# Patient Record
Sex: FEMALE | Race: AMERICAN INDIAN OR ALASKA NATIVE | NOT HISPANIC OR LATINO | Employment: UNEMPLOYED | URBAN - METROPOLITAN AREA
[De-identification: names, ages, dates, MRNs, and addresses within clinical notes are randomized per-mention and may not be internally consistent; named-entity substitution may affect disease eponyms.]

---

## 2017-04-26 ENCOUNTER — OFFICE VISIT (OUTPATIENT)
Dept: OPHTHALMOLOGY | Facility: CLINIC | Age: 72
End: 2017-04-26
Attending: OPHTHALMOLOGY
Payer: COMMERCIAL

## 2017-04-26 DIAGNOSIS — H26.493 PCO (POSTERIOR CAPSULAR OPACIFICATION), BILATERAL: ICD-10-CM

## 2017-04-26 DIAGNOSIS — H40.2232 CHRONIC ANGLE-CLOSURE GLAUCOMA OF BOTH EYES, MODERATE STAGE: Primary | ICD-10-CM

## 2017-04-26 PROCEDURE — 92083 EXTENDED VISUAL FIELD XM: CPT | Mod: ZF | Performed by: OPHTHALMOLOGY

## 2017-04-26 PROCEDURE — 99213 OFFICE O/P EST LOW 20 MIN: CPT | Mod: ZF

## 2017-04-26 PROCEDURE — 92015 DETERMINE REFRACTIVE STATE: CPT | Mod: ZF

## 2017-04-26 RX ORDER — DORZOLAMIDE HYDROCHLORIDE AND TIMOLOL MALEATE 20; 5 MG/ML; MG/ML
1 SOLUTION/ DROPS OPHTHALMIC 2 TIMES DAILY
Qty: 1 BOTTLE | Refills: 11 | Status: SHIPPED | OUTPATIENT
Start: 2017-04-26 | End: 2020-01-09

## 2017-04-26 ASSESSMENT — REFRACTION_WEARINGRX
OD_SPHERE: PLANO
OD_ADD: +2.50
OS_AXIS: 180
OD_CYLINDER: +1.00
OD_AXIS: 180
OS_SPHERE: +0.25
OS_CYLINDER: +0.25
OS_ADD: +2.50

## 2017-04-26 ASSESSMENT — REFRACTION_MANIFEST
OS_ADD: +2.50
OD_ADD: +2.50
OS_CYLINDER: +0.25
OS_AXIS: 180
OS_SPHERE: +0.25
OD_AXIS: 180
OD_CYLINDER: +1.00
OD_SPHERE: PLANO

## 2017-04-26 ASSESSMENT — TONOMETRY
OS_IOP_MMHG: 14
OD_IOP_MMHG: 16
IOP_METHOD: APPLANATION

## 2017-04-26 ASSESSMENT — VISUAL ACUITY
OS_CC: 20/20
METHOD: SNELLEN - LINEAR
OD_CC: J16
OD_CC: 20/40
OS_CC: J1+

## 2017-04-26 ASSESSMENT — SLIT LAMP EXAM - LIDS: COMMENTS: DERMATOCHALSIS WITH LASH OVERHANG

## 2017-04-26 ASSESSMENT — CONF VISUAL FIELD
OD_NORMAL: 1
OS_NORMAL: 1

## 2017-04-26 ASSESSMENT — CUP TO DISC RATIO
OS_RATIO: 0.3
OD_RATIO: 0.4

## 2017-04-26 ASSESSMENT — EXTERNAL EXAM - LEFT EYE: OS_EXAM: BROW PTOSIS

## 2017-04-26 NOTE — NURSING NOTE
Chief Complaints and History of Present Illnesses   Patient presents with     Follow Up For     s/p Chronic angle-closure glaucoma(365.23) (Primary Dx);      HPI    Affected eye(s):  Both   Symptoms:     Blurred vision   Decreased vision   Floaters   Tearing      Frequency:  Constant       Do you have eye pain now?:  No      Comments:  Pt stated foggy vision RE 3-4 months along with colorful flashes of light RE over the last 3 days.   No Cosopt drops used over the last 6 months RE.  No AT's  Jesus Tabares  2:41 PM April 26, 2017

## 2017-04-26 NOTE — MR AVS SNAPSHOT
After Visit Summary   4/26/2017    Marleen Bobo    MRN: [id number]           Patient Information     Date Of Birth          1945        Visit Information        Provider Department      4/26/2017 2:00 PM Juan Rosenthal MD [facility]        Today's Diagnoses     Chronic angle-closure glaucoma of right eye, unspecified glaucoma stage    -  1       Follow-ups after your visit        Your next 10 appointments already scheduled     May 17, 2017 12:45 PM CDT   RETURN GENERAL with Juan Rosenthal MD   Eye Clinic ([facility])    [address]  [address] [facility]  [address]  [facility] [address]   [phone]              Who to contact     Please call your clinic at [phone] to:    Ask questions about your health    Make or cancel appointments    Discuss your medicines    Learn about your test results    Speak to your doctor   If you have compliments or concerns about an experience at your clinic, or if you wish to file a complaint, please contact [facility] Patient Relations at [phone] or email us at [email or web address]         Additional Information About Your Visit        MyChart Information     [employer] gives you secure access to your electronic health record. If you see a primary care provider, you can also send messages to your care team and make appointments. If you have questions, please call your primary care clinic.  If you do not have a primary care provider, please call [phone] and they will assist you.      [employer] is an electronic gateway that provides easy, online access to your medical records. With [employer], you can request a clinic appointment, read your test results, renew a prescription or communicate with your care team.     To access your existing account, please contact your [facility] or call [phone] for assistance.        Care EveryWhere ID      This is your Care EveryWhere ID. This could be used by other organizations to access your [facility] medical records  [id number]         Blood Pressure from Last 3 Encounters:   No data found for BP    Weight from Last 3 Encounters:   No data found for Wt              We Performed the Following     OVF 24-2 Dynamic OU          Where to get your medicines      These medications were sent to [employer] [facility] [address] [address] AT [address] [address]     Phone:  [phone]     dorzolamide-timolol 2-0.5 % ophthalmic solution          Primary Care Provider Office Phone # Fax #    [facility] [phone]       [address] [facility] [address]        Thank you!     Thank you for choosing EYE CLINIC  for your care. Our goal is always to provide you with excellent care. Hearing back from our patients is one way we can continue to improve our services. Please take a few minutes to complete the written survey that you may receive in the mail after your visit with us. Thank you!             Your Updated Medication List - Protect others around you: Learn how to safely use, store and throw away your medicines at www.disposemymeds.org.          This list is accurate as of: 4/26/17  4:17 PM.  Always use your most recent med list.                   Brand Name Dispense Instructions for use    * carbidopa-levodopa  MG per tablet    SINEMET     Take 1 tablet by mouth 3 times daily       * carbidopa-levodopa  mg per CR half-tab    SINEMET CR     Take 1 half-tab by mouth 2 times daily       dorzolamide-timolol 2-0.5 % ophthalmic solution    COSOPT    1 Bottle    Place 1 drop into the right eye 2 times daily       etanercept 50 MG/ML injection    ENBREL     Inject 50 mg Subcutaneous once a week       hydrochlorothiazide 25 MG tablet    HYDRODIURIL     Take 25 mg by mouth daily       loratadine 10 MG tablet    CLARITIN      Take 10 mg by mouth daily       omeprazole 20 MG tablet      Take 20 mg by mouth daily       PERCOCET 7.5-325 MG per tablet   Generic drug:  oxyCODONE-acetaminophen      Take 1 tablet by mouth every 4 hours as needed       * Notice:  This list has 2 medication(s) that are the same as other medications prescribed for you. Read the directions carefully, and ask your doctor or other care provider to review them with you.

## 2017-04-26 NOTE — PROGRESS NOTES
"Ms. Bobo is a 70 year old female with chronic angle closure glaucoma, s/p cataract extraction both eyes who presents for follow up. Has noted new \"film\" across vision in the right eye the past 2-3 months. Has slightly worsened the past few months. Previously was on Cosopt BID in the right eye, ran out \"at least 6 months ago\" and has not been using since that time. Did not seek evaluation sooner due to outside familial stressors.     Has chronic floaters, no change in floaters but has had occasional, rare flashes of \"swirling\" light the past week.     PMH notable for Parkinson's disease, rheumatoid arthritis, hypertension. States blood pressure well controlled.    Denies any changes in vision in the left eye.    Ocular history  Chronic angle closure glacuoma  CE/IOL both eyes, iStent right eye  SLT right eye      Assessment & Plan      Marleen Bobo is a 71 year old female with the following diagnoses:   1. Chronic angle-closure glaucoma of both eyes, moderate stage    2. PCO (posterior capsular opacification), bilateral       Marleen has moderately dense diffuse PCO in the right eye which is likely the culprit for her visual changes. Her visual fields look worse in both eyes today but have poor reliability and have fluctuated over time. Her intraocular pressures are not elevated but are higher than they have been the past two visits. The necessity of resuming Cosopt BID in the right eye was discussed with the patient, and will plan to have the patient RTC in 3 weeks for:    -Yag capsulotomy, right eye  -RNFL-OCT- both eyes; to assess if patient is developing further glaucomatous changes  -IOP check back with the patient back on Cosopt    Juan Rosenthal MD  PGY3, Dept of Ophthalmology    Attending Physician Attestation: Complete documentation of historical and exam elements from today's encounter can be found in the full encounter summary report (not reduplicated in this progress note). I personally obtained " the chief complaint(s) and history of present illness.  I confirmed and edited as necessary the review of systems, past medical/surgical history, family history, social history, and examination findings as documented by others; and I examined the patient myself. I personally reviewed the relevant tests, images, and reports as documented above. I formulated and edited as necessary the assessment and plan and discussed the findings and management plan with the patient and family. - Dario Johnston MD  4/26/2017   Attending Physician Image/Tesing Attestation: I have personally view and interpreted all testing/images as documented in imaging/procedures

## 2017-05-17 ENCOUNTER — OFFICE VISIT (OUTPATIENT)
Dept: OPHTHALMOLOGY | Facility: CLINIC | Age: 72
End: 2017-05-17
Attending: OPHTHALMOLOGY
Payer: COMMERCIAL

## 2017-05-17 DIAGNOSIS — H40.2230 CHRONIC ANGLE-CLOSURE GLAUCOMA OF BOTH EYES, UNSPECIFIED GLAUCOMA STAGE: Primary | ICD-10-CM

## 2017-05-17 DIAGNOSIS — H26.491 PCO (POSTERIOR CAPSULE OPACIFICATION), RIGHT: ICD-10-CM

## 2017-05-17 PROCEDURE — 66821 AFTER CATARACT LASER SURGERY: CPT | Mod: ZF | Performed by: OPHTHALMOLOGY

## 2017-05-17 PROCEDURE — 99212 OFFICE O/P EST SF 10 MIN: CPT | Mod: ZF

## 2017-05-17 PROCEDURE — 92133 CPTRZD OPH DX IMG PST SGM ON: CPT | Mod: ZF | Performed by: OPHTHALMOLOGY

## 2017-05-17 ASSESSMENT — TONOMETRY
OS_IOP_MMHG: 15
IOP_METHOD: TONOPEN
OD_IOP_MMHG: 10

## 2017-05-17 ASSESSMENT — REFRACTION_WEARINGRX
OS_CYLINDER: +0.25
OD_CYLINDER: +1.00
OS_ADD: +2.50
OD_AXIS: 180
OD_SPHERE: PLANO
OD_ADD: +2.50
OS_AXIS: 180
OS_SPHERE: +0.25

## 2017-05-17 ASSESSMENT — VISUAL ACUITY
CORRECTION_TYPE: GLASSES
OS_CC: 20/20
OD_CC: 20/30
METHOD: SNELLEN - LINEAR

## 2017-05-17 ASSESSMENT — CONF VISUAL FIELD
OD_NORMAL: 1
OS_NORMAL: 1

## 2017-05-17 ASSESSMENT — SLIT LAMP EXAM - LIDS: COMMENTS: DERMATOCHALSIS WITH LASH OVERHANG

## 2017-05-17 ASSESSMENT — CUP TO DISC RATIO
OS_RATIO: 0.3
OD_RATIO: 0.4

## 2017-05-17 ASSESSMENT — EXTERNAL EXAM - LEFT EYE: OS_EXAM: BROW PTOSIS

## 2017-05-17 NOTE — NURSING NOTE
Chief Complaints and History of Present Illnesses   Patient presents with     Follow Up For     s/p Chronic angle-closure glaucoma of both eyes, moderate stage      HPI    Affected eye(s):  Right   Symptoms:     Blurred vision   Floaters   Flashes   Tearing   Dryness (Comment: RE more than Left)      Duration:  3 weeks   Frequency:  Constant       Do you have eye pain now?:  No      Comments:  Pt. stated foggy vision RE 3-4 months along with colorful flashes of light RE 1-2 times daily.   Cosopt RE last night 9:00 PM  Jesus Tabares  12:48 PM May 17, 2017

## 2017-05-17 NOTE — MR AVS SNAPSHOT
After Visit Summary   5/17/2017    Marleen Bobo    MRN: [id number]           Patient Information     Date Of Birth          1945        Visit Information        Provider Department      5/17/2017 12:45 PM Juan Rosenthal MD [facility]        Today's Diagnoses     Chronic angle-closure glaucoma of both eyes, unspecified glaucoma stage    -  1    PCO (posterior capsule opacification), right           Follow-ups after your visit        Your next 10 appointments already scheduled     May 24, 2017 12:45 PM CDT   RETURN GENERAL with Juan Rosenthal MD   Eye Clinic ([facility])    [address]  [address] [facility]  9[address] Clin [address]  [facility] [address]   [phone]              Who to contact     Please call your clinic at [phone] to:    Ask questions about your health    Make or cancel appointments    Discuss your medicines    Learn about your test results    Speak to your doctor   If you have compliments or concerns about an experience at your clinic, or if you wish to file a complaint, please contact [facility] Patient Relations at [phone] or email us at [email or web address]         Additional Information About Your Visit        MyChart Information     [employer] gives you secure access to your electronic health record. If you see a primary care provider, you can also send messages to your care team and make appointments. If you have questions, please call your primary care clinic.  If you do not have a primary care provider, please call [phone] and they will assist you.      [employer] is an electronic gateway that provides easy, online access to your medical records. With [employer], you can request a clinic appointment, read your test results, renew a prescription or communicate with your care team.     To access your existing account, please contact your [facility] or call  [phone] for assistance.        Care EveryWhere ID     This is your Care EveryWhere ID. This could be used by other organizations to access your [facility] medical records  [id number]         Blood Pressure from Last 3 Encounters:   No data found for BP    Weight from Last 3 Encounters:   No data found for Wt              We Performed the Following     DILATED FUNDUS EXAM     OCT Optic Nerve RNFL Spectralis OU (both eyes)     OCT Optic Nerve RNFL Spectralis OU (both eyes)     YAG Capsulotomy OD (right eye)        Primary Care Provider Office Phone # Fax #    [facility] [phone] [address] [facility] [address]        Thank you!     Thank you for choosing EYE CLINIC  for your care. Our goal is always to provide you with excellent care. Hearing back from our patients is one way we can continue to improve our services. Please take a few minutes to complete the written survey that you may receive in the mail after your visit with us. Thank you!             Your Updated Medication List - Protect others around you: Learn how to safely use, store and throw away your medicines at www.disposemymeds.org.          This list is accurate as of: 5/17/17  2:02 PM.  Always use your most recent med list.                   Brand Name Dispense Instructions for use    * carbidopa-levodopa  MG per tablet    SINEMET     Take 1 tablet by mouth 3 times daily       * carbidopa-levodopa  mg per CR half-tab    SINEMET CR     Take 1 half-tab by mouth 2 times daily       dorzolamide-timolol 2-0.5 % ophthalmic solution    COSOPT    1 Bottle    Place 1 drop into the right eye 2 times daily       etanercept 50 MG/ML injection    ENBREL     Inject 50 mg Subcutaneous once a week       hydrochlorothiazide 25 MG tablet    HYDRODIURIL     Take 25 mg by mouth daily       loratadine 10 MG tablet    CLARITIN     Take 10 mg by mouth daily       omeprazole 20 MG tablet      Take 20 mg  by mouth daily       PERCOCET 7.5-325 MG per tablet   Generic drug:  oxyCODONE-acetaminophen      Take 1 tablet by mouth every 4 hours as needed       * Notice:  This list has 2 medication(s) that are the same as other medications prescribed for you. Read the directions carefully, and ask your doctor or other care provider to review them with you.

## 2017-05-17 NOTE — PROGRESS NOTES
Ms. Bobo is a 70 year old female here for follow up chronic angle closure glaucoma, right eye. Restarted Cosopt BID after last visit, no issues. Vision stable, seeing through film in right eye. Interested in Yag capsulotomy today.      Ocular history  Chronic angle closure glacuoma  CE/IOL both eyes, iStent right eye  SLT right eye      Assessment & Plan      Marleen Bobo is a 71 year old female with the following diagnoses:   1. Chronic angle-closure glaucoma of both eyes, unspecified glaucoma stage    2. 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 Her intraocular pressure has come down nicely since resuming Cosopt. Will resume BID and have patient follow up with Dr Johnston    Yag capsulotomy performed OD today; will have patient follow up in one week for jeremias      Juan Rosenthal MD  PGY3, Dept of Ophthalmology    Teaching statement:  Complete documentation of historical and exam elements from today's encounter can be found in the full encounter summary report (not reduplicated in this progress note). I personally obtained the chief complaint(s) and history of present illness.  I confirmed and edited as necessary the review of systems, past medical/surgical history, family history, social history, and examination findings as documented by others; and I examined the patient myself. I personally reviewed the relevant tests, images, and reports as documented above.     I formulated and edited as necessary the assessment and plan and discussed the findings and management plan with the patient and family.    Ana Maya MD  Comprehensive Ophthalmology & Ocular Pathology  Department of Ophthalmology and Visual Neurosciences  [email or web address]  Pager [phone]

## 2017-05-24 ENCOUNTER — OFFICE VISIT (OUTPATIENT)
Dept: OPHTHALMOLOGY | Facility: CLINIC | Age: 72
End: 2017-05-24
Attending: OPHTHALMOLOGY
Payer: COMMERCIAL

## 2017-05-24 DIAGNOSIS — H26.491 PCO (POSTERIOR CAPSULE OPACIFICATION), RIGHT: Primary | ICD-10-CM

## 2017-05-24 DIAGNOSIS — H40.2232 CHRONIC ANGLE-CLOSURE GLAUCOMA OF BOTH EYES, MODERATE STAGE: ICD-10-CM

## 2017-05-24 PROCEDURE — 99213 OFFICE O/P EST LOW 20 MIN: CPT | Mod: ZF

## 2017-05-24 ASSESSMENT — REFRACTION_MANIFEST
OD_ADD: +2.50
OD_SPHERE: +0.25
OD_CYLINDER: +1.00
OD_AXIS: 180
OS_CYLINDER: +0.25
OS_AXIS: 180
OS_SPHERE: +0.25
OS_ADD: +2.50

## 2017-05-24 ASSESSMENT — REFRACTION_WEARINGRX
OD_AXIS: 180
OD_SPHERE: PLANO
OS_AXIS: 180
OD_CYLINDER: +1.00
OD_ADD: +2.50
OS_CYLINDER: +0.25
OS_SPHERE: +0.25
OS_ADD: +2.50

## 2017-05-24 ASSESSMENT — CUP TO DISC RATIO: OD_RATIO: 0.4

## 2017-05-24 ASSESSMENT — TONOMETRY
OS_IOP_MMHG: 12
OD_IOP_MMHG: 15
IOP_METHOD: TONOPEN

## 2017-05-24 ASSESSMENT — VISUAL ACUITY
OD_CC: 20/40
OD_PH_CC: 20/30
OS_CC: J1
OS_CC: 20/30
OD_CC: J2
METHOD: SNELLEN - LINEAR

## 2017-05-24 ASSESSMENT — SLIT LAMP EXAM - LIDS: COMMENTS: DERMATOCHALSIS WITH LASH OVERHANG

## 2017-05-24 ASSESSMENT — EXTERNAL EXAM - RIGHT EYE: OD_EXAM: NORMAL

## 2017-05-24 ASSESSMENT — CONF VISUAL FIELD
OS_NORMAL: 1
OD_NORMAL: 1

## 2017-05-24 ASSESSMENT — EXTERNAL EXAM - LEFT EYE: OS_EXAM: BROW PTOSIS

## 2017-05-24 NOTE — NURSING NOTE
Chief Complaints and History of Present Illnesses   Patient presents with     Follow Up For     s/p Chronic angle-closure glaucoma of both eyes, unspecified glaucoma stage      HPI    Affected eye(s):  Right   Symptoms:     No blurred vision   No decreased vision   No distorted vision   No floaters   No flashes   No foreign body sensation   No Dryness      Duration:  1 week   Frequency:  Constant       Do you have eye pain now?:  No      Comments:  Pt stated vision has improved right eye over the last 1 week.  Jesus Tabares  12:50 PM May 24, 2017

## 2017-05-24 NOTE — MR AVS SNAPSHOT
After Visit Summary   5/24/2017    Marleen Bobo    MRN: [id number]           Patient Information     Date Of Birth          1945        Visit Information        Provider Department      5/24/2017 12:45 PM Juan Rosenthal MD [facility]        Today's Diagnoses     PCO (posterior capsule opacification), right    -  1    Chronic angle-closure glaucoma of both eyes, moderate stage           Follow-ups after your visit        Follow-up notes from your care team     Return in about 6 months (around 11/24/2017) for glaucoma follow up with Dr Johnston.      Who to contact     Please call your clinic at [phone] to:    Ask questions about your health    Make or cancel appointments    Discuss your medicines    Learn about your test results    Speak to your doctor   If you have compliments or concerns about an experience at your clinic, or if you wish to file a complaint, please contact [facility] Patient Relations at [phone] or email us at [email or web address]         Additional Information About Your Visit        MyChart Information     [employer] gives you secure access to your electronic health record. If you see a primary care provider, you can also send messages to your care team and make appointments. If you have questions, please call your primary care clinic.  If you do not have a primary care provider, please call [phone] and they will assist you.      [employer] is an electronic gateway that provides easy, online access to your medical records. With [employer], you can request a clinic appointment, read your test results, renew a prescription or communicate with your care team.     To access your existing account, please contact your [facility] or call [phone] for assistance.        Care EveryWhere ID     This is your Care EveryWhere ID. This could be used by other organizations to access your [facility]  medical records  [id number]         Blood Pressure from Last 3 Encounters:   No data found for BP    Weight from Last 3 Encounters:   No data found for Wt              Today, you had the following     No orders found for display       Primary Care Provider Office Phone # Fax #    [facility] [phone] [address]  [facility] [address]        Thank you!     Thank you for choosing EYE CLINIC  for your care. Our goal is always to provide you with excellent care. Hearing back from our patients is one way we can continue to improve our services. Please take a few minutes to complete the written survey that you may receive in the mail after your visit with us. Thank you!             Your Updated Medication List - Protect others around you: Learn how to safely use, store and throw away your medicines at www.disposemymeds.org.          This list is accurate as of: 5/24/17  4:16 PM.  Always use your most recent med list.                   Brand Name Dispense Instructions for use    * carbidopa-levodopa  MG per tablet    SINEMET     Take 1 tablet by mouth 3 times daily       * carbidopa-levodopa  mg per CR half-tab    SINEMET CR     Take 1 half-tab by mouth 2 times daily       dorzolamide-timolol 2-0.5 % ophthalmic solution    COSOPT    1 Bottle    Place 1 drop into the right eye 2 times daily       etanercept 50 MG/ML injection    ENBREL     Inject 50 mg Subcutaneous once a week       hydrochlorothiazide 25 MG tablet    HYDRODIURIL     Take 25 mg by mouth daily       loratadine 10 MG tablet    CLARITIN     Take 10 mg by mouth daily       omeprazole 20 MG tablet      Take 20 mg by mouth daily       PERCOCET 7.5-325 MG per tablet   Generic drug:  oxyCODONE-acetaminophen      Take 1 tablet by mouth every 4 hours as needed       * Notice:  This list has 2 medication(s) that are the same as other medications prescribed for you. Read the directions carefully, and  ask your doctor or other care provider to review them with you.

## 2017-05-24 NOTE — PROGRESS NOTES
Ms. Bobo is a 71 year old female here for 1 week follow up s/p Yag capsulotomy, right eye. Feels that vision is much clearer and no longer a film over her vision. Forgot to use Cosopt this morning.       Ocular history  Chronic angle closure glacuoma  CE/IOL both eyes, iStent right eye  SLT right eye      Assessment & Plan      Marleen Bobo is a 71 year old female with the following diagnoses:   1. PCO (posterior capsule opacification), right    2. Chronic angle-closure glaucoma of both eyes, moderate stage       Refracts to 20/20 OU s/p Yag capsulotomy OD. MRx given.  IOP acceptable; forgot to use Cosopt today  Continue Cosopt BID OD  RNFL-OCT and visual fields stable last visit- recommend follow up for glaucoma with Dr Johnston in 6 months      Juan Rosenthal MD  PGY3, Dept of Ophthalmology    Teaching statement:  Complete documentation of historical and exam elements from today's encounter can be found in the full encounter summary report (not reduplicated in this progress note). I personally obtained the chief complaint(s) and history of present illness.  I confirmed and edited as necessary the review of systems, past medical/surgical history, family history, social history, and examination findings as documented by others; and I examined the patient myself. I personally reviewed the relevant tests, images, and reports as documented above.     I formulated and edited as necessary the assessment and plan and discussed the findings and management plan with the patient and family.    Ana Maya MD  Comprehensive Ophthalmology & Ocular Pathology  Department of Ophthalmology and Visual Neurosciences  [email or web address]  Pager [phone]

## 2018-01-04 ENCOUNTER — TRANSFERRED RECORDS (OUTPATIENT)
Dept: HEALTH INFORMATION MANAGEMENT | Facility: CLINIC | Age: 73
End: 2018-01-04

## 2018-04-30 ENCOUNTER — TRANSFERRED RECORDS (OUTPATIENT)
Dept: HEALTH INFORMATION MANAGEMENT | Facility: CLINIC | Age: 73
End: 2018-04-30

## 2018-07-31 DIAGNOSIS — H40.2230 CHRONIC ANGLE-CLOSURE GLAUCOMA OF BOTH EYES: Primary | ICD-10-CM

## 2019-04-08 ENCOUNTER — TRANSFERRED RECORDS (OUTPATIENT)
Dept: HEALTH INFORMATION MANAGEMENT | Facility: CLINIC | Age: 74
End: 2019-04-08

## 2019-09-29 ENCOUNTER — HEALTH MAINTENANCE LETTER (OUTPATIENT)
Age: 74
End: 2019-09-29

## 2019-11-26 ENCOUNTER — TRANSCRIBE ORDERS (OUTPATIENT)
Dept: OTHER | Age: 74
End: 2019-11-26

## 2019-11-26 DIAGNOSIS — M54.9 BACK PAIN: ICD-10-CM

## 2019-11-26 DIAGNOSIS — M06.9 RA (RHEUMATOID ARTHRITIS) (H): ICD-10-CM

## 2019-11-26 DIAGNOSIS — M25.551 HIP PAIN, RIGHT: Primary | ICD-10-CM

## 2019-12-09 ENCOUNTER — DOCUMENTATION ONLY (OUTPATIENT)
Dept: CARE COORDINATION | Facility: CLINIC | Age: 74
End: 2019-12-09

## 2019-12-10 NOTE — TELEPHONE ENCOUNTER
DIAGNOSIS: Right Hip and low back pain -  Referred by Octaviano Nolan CNP from [facility] -  Med Recs at [facility]     APPOINTMENT DATE: 12.17.19   NOTES STATUS DETAILS   OFFICE NOTE from referring provider Received 11.26.19 [facility]   OFFICE NOTE from other specialist Care every where  [facility] [place]   DISCHARGE SUMMARY from hospital N/A    DISCHARGE REPORT from the ER N/A    OPERATIVE REPORT N/A    MEDICATION LIST N/A    IMPLANT RECORD/STICKER N/A    LABS     CBC/DIFF Internal 7.4.09   CULTURES N/A    INJECTIONS DONE IN RADIOLOGY N/A    MRI N/A    CT SCAN N/A    XRAYS (IMAGES & REPORTS) internal 7.4.09 abd/pelvis   TUMOR     PATHOLOGY  Slides & report N/A      12.10.19 MJ 1:46 PM  Requested med recs and images from [facility] . Nothing from [facility] in care Everywhere related to hip and back pain.    12.11.19 RH 10:27AM  Sent request to [facility] [place] for ultrasound imaging- recieved

## 2019-12-17 ENCOUNTER — PRE VISIT (OUTPATIENT)
Dept: ORTHOPEDICS | Facility: CLINIC | Age: 74
End: 2019-12-17

## 2019-12-23 ENCOUNTER — OFFICE VISIT (OUTPATIENT)
Dept: ORTHOPEDICS | Facility: CLINIC | Age: 74
End: 2019-12-23
Payer: COMMERCIAL

## 2019-12-23 ENCOUNTER — ANCILLARY PROCEDURE (OUTPATIENT)
Dept: GENERAL RADIOLOGY | Facility: CLINIC | Age: 74
End: 2019-12-23
Payer: COMMERCIAL

## 2019-12-23 VITALS — SYSTOLIC BLOOD PRESSURE: 127 MMHG | DIASTOLIC BLOOD PRESSURE: 64 MMHG | HEART RATE: 60 BPM

## 2019-12-23 DIAGNOSIS — M53.3 CHRONIC RIGHT SI JOINT PAIN: ICD-10-CM

## 2019-12-23 DIAGNOSIS — M54.50 LOW BACK PAIN, UNSPECIFIED BACK PAIN LATERALITY, UNSPECIFIED CHRONICITY, UNSPECIFIED WHETHER SCIATICA PRESENT: ICD-10-CM

## 2019-12-23 DIAGNOSIS — G89.29 CHRONIC MIDLINE LOW BACK PAIN WITHOUT SCIATICA: Primary | ICD-10-CM

## 2019-12-23 DIAGNOSIS — M25.551 RIGHT HIP PAIN: ICD-10-CM

## 2019-12-23 DIAGNOSIS — M54.50 CHRONIC MIDLINE LOW BACK PAIN WITHOUT SCIATICA: Primary | ICD-10-CM

## 2019-12-23 DIAGNOSIS — G89.29 CHRONIC RIGHT SI JOINT PAIN: ICD-10-CM

## 2019-12-23 DIAGNOSIS — M54.50 LOW BACK PAIN, UNSPECIFIED BACK PAIN LATERALITY, UNSPECIFIED CHRONICITY, UNSPECIFIED WHETHER SCIATICA PRESENT: Primary | ICD-10-CM

## 2019-12-23 RX ORDER — CARBIDOPA AND LEVODOPA 50; 200 MG/1; MG/1
1 TABLET, EXTENDED RELEASE ORAL AT BEDTIME
COMMUNITY
Start: 2016-12-29

## 2019-12-23 RX ORDER — HYDROCHLOROTHIAZIDE 50 MG/1
TABLET ORAL
COMMUNITY
Start: 2019-11-22 | End: 2022-04-07

## 2019-12-23 ASSESSMENT — PATIENT HEALTH QUESTIONNAIRE - PHQ9
SUM OF ALL RESPONSES TO PHQ QUESTIONS 1-9: 4
SUM OF ALL RESPONSES TO PHQ QUESTIONS 1-9: 4
10. IF YOU CHECKED OFF ANY PROBLEMS, HOW DIFFICULT HAVE THESE PROBLEMS MADE IT FOR YOU TO DO YOUR WORK, TAKE CARE OF THINGS AT HOME, OR GET ALONG WITH OTHER PEOPLE: SOMEWHAT DIFFICULT

## 2019-12-23 ASSESSMENT — PAIN SCALES - GENERAL: PAINLEVEL: EXTREME PAIN (8)

## 2019-12-23 NOTE — PROGRESS NOTES
Subjective:   Marleen Bobo is a 74 year old female who presents with midline low back pain and right hip pain. Prior hx of L4-S1 fusion.  She states that in approximately 1991 she had an L4-S1 fusion.  This is treated her quite well until the summer 2019 when she was weeding in her garden and she got up and she twisted and fell.  Since that time she has been having low back pain which occurs in a belt-like fashion across her lower back.  She also has some pain radiating to the lateral right hip.  She has no groin pain.  She has no cauda equina symptoms.  She has no perineal anesthesia or paresthesias.  She has no lower extremity radiculopathy.  While she does have a history of ongoing peripheral neuropathy this is not changed at all.  She notes that she is having more difficulty with some falling intermittently but she attributes this to her worsening Parkinson's disease.  She states that they have suggested a deep brain stimulator however she is not amenable to this.  Because she has had multiple surgeries on her cranium and metal in place she is not a candidate for an MRI.    AP and lateral of her lumbar spine demonstrate an L4-S1 fusion.  She otherwise has degenerative changes of the lumbar spine.    2 views of the right hip demonstrate some very mild early degenerative changes but no evidence of fracture or significant arthritis per my read.    Background:   Date of injury: None  Duration of symptoms: 4-5 months low back; years for hip  Mechanism of Injury: Insidious Onset for low back while weeding. Chronic for hip.  Aggravating factors: Right side lying, walking, stairs, in/out of car  Relieving Factors: Percocet  Prior Evaluation: Dr. Neff    PAST MEDICAL, SOCIAL, SURGICAL AND FAMILY HISTORY: She  has a past medical history of Arthritis, Double vision, Glaucoma, Headache(784.0), Hypertension, Sneezing, and Stroke (H). She also has no past medical history of Diabetes (H).  She  has a past surgical  history that includes cataract iol, rt/lt (Right, 1/14/15) and cataract iol, rt/lt (Left, 2015).  Her family history includes Arthritis in her mother; Cardiovascular in her father; Cerebrovascular Disease in her father; Diabetes in her maternal grandmother; Glaucoma in her mother.  She reports that she quit smoking about 8 years ago. Her smoking use included cigarettes. She has never used smokeless tobacco. She reports current alcohol use.    ALLERGIES: She is allergic to tegaderm transparent dressing (informational only) and dilantin [phenytoin].    CURRENT MEDICATIONS: She has a current medication list which includes the following prescription(s): carbidopa-levodopa, hydrochlorothiazide, hydrochlorothiazide, loratadine, oxycodone-acetaminophen, carbidopa-levodopa, carbidopa-levodopa, dorzolamide-timolol, etanercept, and omeprazole.     REVIEW OF SYSTEMS: 10 point review of systems is negative except as noted above.     Exam:   /64 (BP Location: Left arm, Patient Position: Sitting, Cuff Size: Adult Regular)   Pulse 60      CONSTITUTIONAL: alert and no distress  HEAD: Normocephalic. No masses, lesions, tenderness or abnormalities  SKIN: no suspicious lesions or rashes  GAIT: broad based and shuffling  NEUROLOGIC: Non-focal  PSYCHIATRIC: affect flat and mentation appears normal.    MUSCULOSKELETAL:   Right hip: She has full range of motion in the right hip.  She has no groin pain with full flexion or full internal rotation.  She has no medial or lateral discomfort with full external rotation.  Flexion, adduction and internal rotation is negative.  Flexion, abduction and external rotation is negative.  She is nontender over the femoral triangle or over the greater trochanter.    Lumbar spine: She is nontender over the thoracic or lumbar spine.  She is tender to palpation over the right SI joint.  She is nontender over the left SI joint.  Deep tendon reflexes in the bilateral lower extremities are trace and  symmetric including patellar and Achilles.  She has equal and symmetric strength which would be 5- out of 5 bilaterally including hip flexion, knee extension, knee flexion, ankle dorsiflexion and ankle plantarflexion.       Assessment/Plan:   Marleen is a 74-year-old female who is status post L4-S1 lumbar fusion who is had some falls associated with her Parkinson's disease and since a fall this summer has had very slow recovery in terms of her lumbar spine.  She will not tolerate an MRI.  I am going to have her see 1 of our spine surgeons for further evaluation.  I believe her right hip pain is primarily emanating from the right SI joint.  When she is seen her hip surgeon they may determine they want to proceed with a right SI joint injection.  In the interim we will put her in a lumbosacral corset which may be beneficial.

## 2019-12-24 ASSESSMENT — PATIENT HEALTH QUESTIONNAIRE - PHQ9: SUM OF ALL RESPONSES TO PHQ QUESTIONS 1-9: 4

## 2019-12-31 ENCOUNTER — MEDICAL CORRESPONDENCE (OUTPATIENT)
Dept: HEALTH INFORMATION MANAGEMENT | Facility: CLINIC | Age: 74
End: 2019-12-31

## 2020-01-03 DIAGNOSIS — M54.50 LUMBAR PAIN: Primary | ICD-10-CM

## 2020-01-07 ENCOUNTER — OFFICE VISIT (OUTPATIENT)
Dept: ORTHOPEDICS | Facility: CLINIC | Age: 75
End: 2020-01-07
Attending: FAMILY MEDICINE
Payer: COMMERCIAL

## 2020-01-07 ENCOUNTER — ANCILLARY PROCEDURE (OUTPATIENT)
Dept: GENERAL RADIOLOGY | Facility: CLINIC | Age: 75
End: 2020-01-07
Attending: ORTHOPAEDIC SURGERY
Payer: COMMERCIAL

## 2020-01-07 DIAGNOSIS — M54.50 LUMBAR PAIN: Primary | ICD-10-CM

## 2020-01-07 ASSESSMENT — ENCOUNTER SYMPTOMS
HEMATURIA: 0
NAUSEA: 0
DIARRHEA: 0
DIZZINESS: 0
ARTHRALGIAS: 1
SEIZURES: 0
LOSS OF CONSCIOUSNESS: 0
MUSCLE CRAMPS: 0
CONSTIPATION: 1
DISTURBANCES IN COORDINATION: 0
HEADACHES: 1
BLOATING: 0
ABDOMINAL PAIN: 0
SPEECH CHANGE: 0
BACK PAIN: 1
HEARTBURN: 1
NECK PAIN: 0
MEMORY LOSS: 0
DYSURIA: 0
JOINT SWELLING: 1
VOMITING: 0
MYALGIAS: 0

## 2020-01-07 ASSESSMENT — PATIENT HEALTH QUESTIONNAIRE - PHQ9
SUM OF ALL RESPONSES TO PHQ QUESTIONS 1-9: 7
SUM OF ALL RESPONSES TO PHQ QUESTIONS 1-9: 7

## 2020-01-07 NOTE — NURSING NOTE
Reason For Visit:   Chief Complaint   Patient presents with     Consult     chronic midline low abck pain        There were no vitals taken for this visit.         Hadley Strickland, ATC

## 2020-01-07 NOTE — LETTER
1/7/2020       RE: Marleen Bobo  [address]  [facility] [id number]     Dear Colleague,    Thank you for referring your patient, Marlene Bobo, to the [facility] at [facility]. Please see a copy of my visit note below.    Spine Surgery Consultation    REFERRING PHYSICIAN: Keegan Mixon   PRIMARY CARE PHYSICIAN: [facility]           Chief Complaint:   Consult (chronic midline low abck pain )  Low back and right buttock area pain    History of Present Illness:  Symptom Profile Including: location of symptoms, onset, severity, exacerbating/alleviating factors, previous treatments:        Marleen Bobo is a 74 year old female with PMH of Parkinson's disease, L4-S1 fusion in the 1990s, L3-4 decompression and 2009 who presents with several months of increasingly worse lower back pain and right buttock area pain after a ground-level fall in June 2019.  Patient states that her pain is primarily over the lower back and right paraspinal and buttock area.  Her pain has gotten significantly worse over the past 2 months.  She is able to stand only for 5 minutes and able to walk no more than 1 block.  She has a cane and walker but does not use it because she feels embarrassed.  She rates her pain as 10 out of 10 and has occasional awakening at night if she rolls the wrong way.  She is currently taking Percocet at night.  She cannot tolerate physical therapy due to pain.  She has not had any epidural steroid injections and does not want any.  She has tried gabapentin without any relief.  She denies any additional symptoms including numbness tingling or weakness.  She has had urinary incontinence for the past 2 to 3 years for which she was told this was a symptom of aging.  She presents today for evaluation and discussion of treatment options.  She states that her symptoms are significantly limiting her ability to perform her daily  activities.         Past Medical History:     Past Medical History:   Diagnosis Date     Arthritis      Double vision      Glaucoma      Headache(784.0)      Hypertension      Sneezing      Stroke (H)             Past Surgical History:     Past Surgical History:   Procedure Laterality Date     CATARACT IOL, RT/LT Right 1/14/15    with iStent     CATARACT IOL, RT/LT Left             Social History:     Social History     Tobacco Use     Smoking status: Former Smoker     Types: Cigarettes     Last attempt to quit: 10/25/2011     Years since quittin.2     Smokeless tobacco: Never Used   Substance Use Topics     Alcohol use: Yes     Comment: about 3 cans a month     The patient has quit smoking 10 years ago and denies any alcohol or other drug use.  She is retired and lives with her family.           Family History:     Family History   Problem Relation Age of Onset     Diabetes Maternal Grandmother      Cerebrovascular Disease Father      Cardiovascular Father      Arthritis Mother      Glaucoma Mother             Allergies:     Allergies   Allergen Reactions     Tegaderm Transparent Dressing (Informational Only) Rash     Dilantin [Phenytoin]             Medications:     Current Outpatient Medications   Medication     carbidopa-levodopa (SINEMET CR)  MG     carbidopa-levodopa (SINEMET CR)  MG CR tablet     carbidopa-levodopa (SINEMET)  MG per tablet     dorzolamide-timolol (COSOPT) 2-0.5 % ophthalmic solution     etanercept (ENBREL) 50 MG/ML injection     hydrochlorothiazide (HYDRODIURIL) 25 MG tablet     hydrochlorothiazide (HYDRODIURIL) 50 MG tablet     loratadine (CLARITIN) 10 MG tablet     omeprazole 20 MG tablet     oxyCODONE-acetaminophen (PERCOCET) 7.5-325 MG per tablet     No current facility-administered medications for this visit.              Review of Systems:     A 10 point ROS was performed and reviewed. Specific responses to these questions are noted at the end of the  document.         Physical Exam:   Vitals: There were no vitals taken for this visit.  Constitutional: awake, alert, cooperative, no apparent distress, appears stated age.    Eyes: The sclera are white.  Ears, Nose, Throat: The trachea is midline.  Psychiatric: The patient has a normal affect.  Respiratory: Breathing non-labored  Cardiovascular: The extremities are warm and perfused.  Pill-rolling tremor bilateral upper extremities.  Skin: No obvious rashes or lesions.  Musculoskeletal, Neurologic, and Spine:          Lumbar Spine:    Appearance - No gross stepoffs or deformities    Motor -     L2-3: Hip flexion 5/5 R and 5/5 L strength          L3/4:  Knee extension R 5/5 and L 5/5 strength         L4/5:  Foot dorsiflexion R 5/5 L 5/5 and       EHL dorsiflexion R 5/5 L 5/5 strength         S1:  Plantarflexion/Peroneal Muscles  R 5/5 and L 5/5 strength    Sensation: intact to light touch L3-S1 distribution BLE      Neurologic:      REFLEXES Left Right                  Patella 1+ 1+   Ankle jerk 1+ 1+   Babinski No upgoing great toe No upgoing great toe   Clonus 0 beats 0 beats       Alignment:  Patient stands with a neutral standing sagittal balance.       Healed midline incision over the lumbar spine.  No pain with forward flexion.  Pain in the lower right lumbar spine with right spinal rotation side bending and facet loading.  Significant tenderness to palpation over the lumbar spine midline and the right paraspinal musculature around the area of the SI joint.  Tenderness to palpation over the right greater trochanter.  No pain with flexion internal rotation of the right hip.  Negative straight leg raise bilaterally.  Negative Reyna test bilaterally.  Negative thigh thrust, pelvic thrust, AP and lateral stress of the SI joints, Gaenslen's test.         Imaging:   We ordered and independently reviewed new radiographs at this clinic visit. The results were discussed with the patient.  Findings include:    X-rays  lumbar spine flexion and extension obtained on 1/7/2020 demonstrates status post L4 S1 fusion without instrumentation.  Significant spondylosis diffuse across the lumbar spine.  No evidence of instability.  Seen also are right iliac stent and multiple vascular clips from a prior non-orthopedic surgery.           Assessment and Plan:   Assessment:  74 year old female with medical history significant for Parkinson's disease, L4-S1 fusion in the 1990s, L3-4 decompression and 2009 who presents with several months of increasingly worse lower back pain and right buttock area pain after a ground-level fall in June 2019.  The patient has been taking Percocet at night, states that gabapentin does not work, cannot tolerate physical therapy due to pain, and has not tried any injections. Differential may include occult fracture of the lumbar spine, spinal stenosis, or disc herniation.  No clinical evidence of sacroiliitis.    Plan:  1. CT myelogram of the lumbar spine (the patient cannot get an MRI due to brain aneurysm clips)  2. Follow-up clinic same day after imaging to discuss results and next steps with Dr. Noe  3. Pain clinic referral for evaluation and treatment  4. Recommend use of cane or walker to prevent additional falls - patient is agreeable  5. No indication for SI injection given that she has no provocative SI findings on exam today.    This patient was seen and discussed with Dr. Noe who agrees with the above    Bala Olivarez MD, PhD  Orthopedic surgery resident PGY 4  [facility]    Attending MD (Dr. Julian Noe) :  I reviewed and verified the history and physical exam of the patient and discussed the patient's management with the other clinical providers involved in this patient's care including any involved residents or physicians assistants. I reviewed the above note and agree with the documented findings and plan of care, which were communicated to the patient.      Julian  SHIVA Noe MD

## 2020-01-07 NOTE — PROGRESS NOTES
Spine Surgery Consultation    REFERRING PHYSICIAN: Keegan Mixon   PRIMARY CARE PHYSICIAN: Clinic, [facility]           Chief Complaint:   Consult (chronic midline low abck pain )  Low back and right buttock area pain    History of Present Illness:  Symptom Profile Including: location of symptoms, onset, severity, exacerbating/alleviating factors, previous treatments:        Marleen Bobo is a 74 year old female with PMH of Parkinson's disease, L4-S1 fusion in the 1990s, L3-4 decompression and 2009 who presents with several months of increasingly worse lower back pain and right buttock area pain after a ground-level fall in June 2019.  Patient states that her pain is primarily over the lower back and right paraspinal and buttock area.  Her pain has gotten significantly worse over the past 2 months.  She is able to stand only for 5 minutes and able to walk no more than 1 block.  She has a cane and walker but does not use it because she feels embarrassed.  She rates her pain as 10 out of 10 and has occasional awakening at night if she rolls the wrong way.  She is currently taking Percocet at night.  She cannot tolerate physical therapy due to pain.  She has not had any epidural steroid injections and does not want any.  She has tried gabapentin without any relief.  She denies any additional symptoms including numbness tingling or weakness.  She has had urinary incontinence for the past 2 to 3 years for which she was told this was a symptom of aging.  She presents today for evaluation and discussion of treatment options.  She states that her symptoms are significantly limiting her ability to perform her daily activities.         Past Medical History:     Past Medical History:   Diagnosis Date     Arthritis      Double vision      Glaucoma      Headache(784.0)      Hypertension      Sneezing      Stroke (H)             Past Surgical History:     Past Surgical History:   Procedure Laterality Date      CATARACT IOL, RT/LT Right 1/14/15    with iStent     CATARACT IOL, RT/LT Left             Social History:     Social History     Tobacco Use     Smoking status: Former Smoker     Types: Cigarettes     Last attempt to quit: 10/25/2011     Years since quittin.2     Smokeless tobacco: Never Used   Substance Use Topics     Alcohol use: Yes     Comment: about 3 cans a month     The patient has quit smoking 10 years ago and denies any alcohol or other drug use.  She is retired and lives with her family.           Family History:     Family History   Problem Relation Age of Onset     Diabetes Maternal Grandmother      Cerebrovascular Disease Father      Cardiovascular Father      Arthritis Mother      Glaucoma Mother             Allergies:     Allergies   Allergen Reactions     Tegaderm Transparent Dressing (Informational Only) Rash     Dilantin [Phenytoin]             Medications:     Current Outpatient Medications   Medication     carbidopa-levodopa (SINEMET CR)  MG     carbidopa-levodopa (SINEMET CR)  MG CR tablet     carbidopa-levodopa (SINEMET)  MG per tablet     dorzolamide-timolol (COSOPT) 2-0.5 % ophthalmic solution     etanercept (ENBREL) 50 MG/ML injection     hydrochlorothiazide (HYDRODIURIL) 25 MG tablet     hydrochlorothiazide (HYDRODIURIL) 50 MG tablet     loratadine (CLARITIN) 10 MG tablet     omeprazole 20 MG tablet     oxyCODONE-acetaminophen (PERCOCET) 7.5-325 MG per tablet     No current facility-administered medications for this visit.              Review of Systems:     A 10 point ROS was performed and reviewed. Specific responses to these questions are noted at the end of the document.         Physical Exam:   Vitals: There were no vitals taken for this visit.  Constitutional: awake, alert, cooperative, no apparent distress, appears stated age.    Eyes: The sclera are white.  Ears, Nose, Throat: The trachea is midline.  Psychiatric: The patient has a normal  affect.  Respiratory: Breathing non-labored  Cardiovascular: The extremities are warm and perfused.  Pill-rolling tremor bilateral upper extremities.  Skin: No obvious rashes or lesions.  Musculoskeletal, Neurologic, and Spine:          Lumbar Spine:    Appearance - No gross stepoffs or deformities    Motor -     L2-3: Hip flexion 5/5 R and 5/5 L strength          L3/4:  Knee extension R 5/5 and L 5/5 strength         L4/5:  Foot dorsiflexion R 5/5 L 5/5 and       EHL dorsiflexion R 5/5 L 5/5 strength         S1:  Plantarflexion/Peroneal Muscles  R 5/5 and L 5/5 strength    Sensation: intact to light touch L3-S1 distribution BLE      Neurologic:      REFLEXES Left Right                  Patella 1+ 1+   Ankle jerk 1+ 1+   Babinski No upgoing great toe No upgoing great toe   Clonus 0 beats 0 beats       Alignment:  Patient stands with a neutral standing sagittal balance.       Healed midline incision over the lumbar spine.  No pain with forward flexion.  Pain in the lower right lumbar spine with right spinal rotation side bending and facet loading.  Significant tenderness to palpation over the lumbar spine midline and the right paraspinal musculature around the area of the SI joint.  Tenderness to palpation over the right greater trochanter.  No pain with flexion internal rotation of the right hip.  Negative straight leg raise bilaterally.  Negative Reyna test bilaterally.  Negative thigh thrust, pelvic thrust, AP and lateral stress of the SI joints, Gaenslen's test.         Imaging:   We ordered and independently reviewed new radiographs at this clinic visit. The results were discussed with the patient.  Findings include:    X-rays lumbar spine flexion and extension obtained on 1/7/2020 demonstrates status post L4 S1 fusion without instrumentation.  Significant spondylosis diffuse across the lumbar spine.  No evidence of instability.  Seen also are right iliac stent and multiple vascular clips from a prior  non-orthopedic surgery.           Assessment and Plan:   Assessment:  74 year old female with medical history significant for Parkinson's disease, L4-S1 fusion in the 1990s, L3-4 decompression and 2009 who presents with several months of increasingly worse lower back pain and right buttock area pain after a ground-level fall in June 2019.  The patient has been taking Percocet at night, states that gabapentin does not work, cannot tolerate physical therapy due to pain, and has not tried any injections. Differential may include occult fracture of the lumbar spine, spinal stenosis, or disc herniation.  No clinical evidence of sacroiliitis.    Plan:  1. CT myelogram of the lumbar spine (the patient cannot get an MRI due to brain aneurysm clips)  2. Follow-up clinic same day after imaging to discuss results and next steps with Dr. Noe  3. Pain clinic referral for evaluation and treatment  4. Recommend use of cane or walker to prevent additional falls - patient is agreeable  5. No indication for SI injection given that she has no provocative SI findings on exam today.    This patient was seen and discussed with Dr. Noe who agrees with the above    Bala Olivarez MD, PhD  Orthopedic surgery resident PGY 4  [facility]    Attending MD (Dr. Julian Noe) :  I reviewed and verified the history and physical exam of the patient and discussed the patient's management with the other clinical providers involved in this patient's care including any involved residents or physicians assistants. I reviewed the above note and agree with the documented findings and plan of care, which were communicated to the patient.      Julian Noe MD    Respectfully,  Julian Noe MD  Spine Surgery  [facility]

## 2020-01-07 NOTE — PROGRESS NOTES
Spine Surgery Consultation    REFERRING PHYSICIAN: Keegan Mixon   PRIMARY CARE PHYSICIAN: Clinic, [facility]           Chief Complaint:   Consult (chronic midline low abck pain )      History of Present Illness:  Symptom Profile Including: location of symptoms, onset, severity, exacerbating/alleviating factors, previous treatments:        Marleen Bobo is a 74 year old female who ***         Past Medical History:     Past Medical History:   Diagnosis Date     Arthritis      Double vision      Glaucoma      Headache(784.0)      Hypertension      Sneezing      Stroke (H)             Past Surgical History:     Past Surgical History:   Procedure Laterality Date     CATARACT IOL, RT/LT Right 1/14/15    with iStent     CATARACT IOL, RT/LT Left             Social History:     Social History     Tobacco Use     Smoking status: Former Smoker     Types: Cigarettes     Last attempt to quit: 10/25/2011     Years since quittin.2     Smokeless tobacco: Never Used   Substance Use Topics     Alcohol use: Yes     Comment: about 3 cans a month            Family History:     Family History   Problem Relation Age of Onset     Diabetes Maternal Grandmother      Cerebrovascular Disease Father      Cardiovascular Father      Arthritis Mother      Glaucoma Mother             Allergies:     Allergies   Allergen Reactions     Tegaderm Transparent Dressing (Informational Only) Rash     Dilantin [Phenytoin]             Medications:     Current Outpatient Medications   Medication     carbidopa-levodopa (SINEMET CR)  MG     carbidopa-levodopa (SINEMET CR)  MG CR tablet     carbidopa-levodopa (SINEMET)  MG per tablet     dorzolamide-timolol (COSOPT) 2-0.5 % ophthalmic solution     etanercept (ENBREL) 50 MG/ML injection     hydrochlorothiazide (HYDRODIURIL) 25 MG tablet     hydrochlorothiazide (HYDRODIURIL) 50 MG tablet     loratadine (CLARITIN) 10 MG tablet     omeprazole 20 MG tablet      oxyCODONE-acetaminophen (PERCOCET) 7.5-325 MG per tablet     No current facility-administered medications for this visit.              Review of Systems:     A 10 point ROS was performed and reviewed. Specific responses to these questions are noted at the end of the document.         Physical Exam:   Vitals: There were no vitals taken for this visit.  Constitutional: awake, alert, cooperative, no apparent distress, appears stated age.    Eyes: The sclera are white.  Ears, Nose, Throat: The trachea is midline.  Psychiatric: The patient has a normal affect.  Respiratory: breathing non-labored  Cardiovascular: The extremities are warm and perfused.  Skin: no obvious rashes or lesions.  Musculoskeletal, Neurologic, and Spine:   ***         Imaging:   We ordered and independently reviewed new radiographs at this clinic visit. The results were discussed with the patient.  Findings include:    ***             Assessment and Plan:   Assessment:  74 year old female with ***     Plan:  1. ***      Respectfully,  Julian Noe MD  Spine Surgery  [facility]      Answers for HPI/ROS submitted by the patient on 1/7/2020   PHQ9 TOTAL SCORE: 7  General Symptoms: No  Skin Symptoms: No  HENT Symptoms: No  EYE SYMPTOMS: No  HEART SYMPTOMS: No  LUNG SYMPTOMS: No  INTESTINAL SYMPTOMS: Yes  URINARY SYMPTOMS: Yes  GYNECOLOGIC SYMPTOMS: No  BREAST SYMPTOMS: No  SKELETAL SYMPTOMS: Yes  BLOOD SYMPTOMS: No  NERVOUS SYSTEM SYMPTOMS: Yes  MENTAL HEALTH SYMPTOMS: No  Heart burn or indigestion: Yes  Nausea: No  Vomiting: No  Abdominal pain: No  Bloating: No  Constipation: Yes  Diarrhea: No  Trouble holding urine or incontinence: Yes  Pain or burning: No  Trouble starting or stopping: No  Increased frequency of urination: No  Blood in urine: No  Decreased frequency of urination: No  Frequent nighttime urination: No  Back pain: Yes  Muscle aches: No  Neck pain: No  Swollen joints: Yes  Joint pain: Yes  Muscle cramps:  No  Trouble with coordination: No  Dizziness or trouble with balance: No  Fainting or black-out spells: No  Memory loss: No  Headache: Yes  Seizures: No  Speech problems: No

## 2020-01-08 ASSESSMENT — PATIENT HEALTH QUESTIONNAIRE - PHQ9: SUM OF ALL RESPONSES TO PHQ QUESTIONS 1-9: 7

## 2020-03-15 ENCOUNTER — HEALTH MAINTENANCE LETTER (OUTPATIENT)
Age: 75
End: 2020-03-15

## 2021-01-14 ENCOUNTER — HEALTH MAINTENANCE LETTER (OUTPATIENT)
Age: 76
End: 2021-01-14

## 2021-05-09 ENCOUNTER — HEALTH MAINTENANCE LETTER (OUTPATIENT)
Age: 76
End: 2021-05-09

## 2021-08-06 DIAGNOSIS — H40.2230 CHRONIC ANGLE-CLOSURE GLAUCOMA OF BOTH EYES, UNSPECIFIED GLAUCOMA STAGE: Primary | ICD-10-CM

## 2021-10-24 ENCOUNTER — HEALTH MAINTENANCE LETTER (OUTPATIENT)
Age: 76
End: 2021-10-24

## 2022-03-07 ENCOUNTER — HOSPITAL ENCOUNTER (OUTPATIENT)
Facility: CLINIC | Age: 77
End: 2022-03-07
Attending: ORTHOPAEDIC SURGERY | Admitting: ORTHOPAEDIC SURGERY
Payer: COMMERCIAL

## 2022-03-10 DIAGNOSIS — Z11.59 ENCOUNTER FOR SCREENING FOR OTHER VIRAL DISEASES: Primary | ICD-10-CM

## 2022-04-07 VITALS — HEIGHT: 66 IN | BODY MASS INDEX: 30.53 KG/M2 | WEIGHT: 190 LBS

## 2022-04-07 RX ORDER — ALBUTEROL SULFATE 0.83 MG/ML
3 SOLUTION RESPIRATORY (INHALATION) EVERY 4 HOURS PRN
COMMUNITY
End: 2022-04-13 | Stop reason: HOSPADM

## 2022-04-07 RX ORDER — ALBUTEROL SULFATE 90 UG/1
1-2 AEROSOL, METERED RESPIRATORY (INHALATION) EVERY 4 HOURS PRN
COMMUNITY
End: 2022-04-13 | Stop reason: HOSPADM

## 2022-04-07 RX ORDER — LORATADINE 10 MG/1
1 TABLET ORAL DAILY PRN
COMMUNITY
Start: 2010-12-22 | End: 2022-04-13 | Stop reason: HOSPADM

## 2022-06-05 ENCOUNTER — HEALTH MAINTENANCE LETTER (OUTPATIENT)
Age: 77
End: 2022-06-05

## 2022-10-15 ENCOUNTER — HEALTH MAINTENANCE LETTER (OUTPATIENT)
Age: 77
End: 2022-10-15

## 2023-06-11 ENCOUNTER — HEALTH MAINTENANCE LETTER (OUTPATIENT)
Age: 78
End: 2023-06-11

## 2023-07-19 RX ORDER — BUDESONIDE AND FORMOTEROL FUMARATE DIHYDRATE 80; 4.5 UG/1; UG/1
2 AEROSOL RESPIRATORY (INHALATION) DAILY PRN
COMMUNITY
Start: 2023-06-29

## 2023-07-19 RX ORDER — NITROGLYCERIN 0.4 MG/1
0.4 TABLET SUBLINGUAL PRN
COMMUNITY
Start: 2022-06-13

## 2023-07-19 RX ORDER — CLOPIDOGREL BISULFATE 75 MG/1
1 TABLET ORAL EVERY MORNING
COMMUNITY
Start: 2023-06-06

## 2023-07-19 RX ORDER — LOSARTAN POTASSIUM 50 MG/1
1 TABLET ORAL DAILY
Status: ON HOLD | COMMUNITY
Start: 2023-07-10 | End: 2023-08-03

## 2023-07-19 RX ORDER — LORATADINE 10 MG/1
10 TABLET ORAL DAILY PRN
COMMUNITY
Start: 2022-04-13

## 2023-07-19 RX ORDER — ROSUVASTATIN CALCIUM 20 MG/1
20 TABLET, COATED ORAL DAILY
COMMUNITY
Start: 2022-04-12

## 2023-07-21 LAB — HBA1C MFR BLD: 5.5 %

## 2023-07-24 ENCOUNTER — TRANSFERRED RECORDS (OUTPATIENT)
Dept: MULTI SPECIALTY CLINIC | Facility: CLINIC | Age: 78
End: 2023-07-24

## 2023-07-26 ENCOUNTER — TRANSFERRED RECORDS (OUTPATIENT)
Dept: MULTI SPECIALTY CLINIC | Facility: CLINIC | Age: 78
End: 2023-07-26

## 2023-07-26 LAB
CREATININE (EXTERNAL): 0.71 MG/DL (ref 0.6–1)
GFR ESTIMATED (EXTERNAL): 88 ML/MIN/1.73M2
GLUCOSE (EXTERNAL): 86 MG/DL (ref 65–99)
POTASSIUM (EXTERNAL): 4.3 MMOL/L (ref 3.5–5.3)

## 2023-07-26 RX ORDER — ASPIRIN 81 MG/1
81 TABLET, CHEWABLE ORAL DAILY
COMMUNITY

## 2023-07-26 NOTE — PHARMACY-ADMISSION MEDICATION HISTORY
Medication history and patient interview completed by pre-admitting RN or pre-op/PACU RN. Reviewed by pharmacist, [facility], [facility] Everywhere, and chart review.       Balta Tran, Pharm.D., [facility]      Last Reviewed by Trudi Olivier, RN on 7/19/2023 at 9:35 AM       Prior to Admission medications    Medication Sig Last Dose Taking? Auth Provider Long Term End Date   aspirin (ASA) 81 MG chewable tablet Take 81 mg by mouth daily  Yes Unknown, Entered By History     budesonide-formoterol (SYMBICORT) 80-4.5 MCG/ACT Inhaler Inhale 2 puffs into the lungs daily as needed  Yes Reported, Patient Yes    carbidopa-levodopa (SINEMET CR)  MG CR tablet Take 1 tablet by mouth At Bedtime And 1 tablet during the night [id number] upon waking with tremors  Yes Reported, Patient Yes    carbidopa-levodopa (SINEMET)  MG per tablet Take 3 tablets by mouth 3 times daily At 0900, 1300 and 1700  Yes Reported, Patient Yes    clopidogrel (PLAVIX) 75 MG tablet Take 1 tablet by mouth every morning  Yes Reported, Patient Yes    etanercept (ENBREL) 50 MG/ML injection Inject 50 mg Subcutaneous once a week  Yes Reported, Patient Yes    loratadine (CLARITIN) 10 MG tablet Take 10 mg by mouth daily as needed  Yes Reported, Patient     losartan (COZAAR) 50 MG tablet Take 1 tablet by mouth daily  Yes Reported, Patient No    nitroGLYcerin (NITROSTAT) 0.4 MG sublingual tablet Place 0.4 mg under the tongue as needed  Yes Reported, Patient Yes    oxyCODONE-acetaminophen (PERCOCET) 7.5-325 MG per tablet Take 1 tablet by mouth every 4 hours as needed for pain Max 4 tabs/day  Yes Reported, Patient     rosuvastatin (CRESTOR) 20 MG tablet Take 20 mg by mouth daily  Yes Reported, Patient Yes

## 2023-07-28 ENCOUNTER — APPOINTMENT (OUTPATIENT)
Dept: GENERAL RADIOLOGY | Facility: CLINIC | Age: 78
DRG: 454 | End: 2023-07-28
Attending: ORTHOPAEDIC SURGERY
Payer: COMMERCIAL

## 2023-07-28 ENCOUNTER — HOSPITAL ENCOUNTER (INPATIENT)
Facility: CLINIC | Age: 78
LOS: 6 days | Discharge: HOME HEALTH | DRG: 454 | End: 2023-08-03
Attending: ORTHOPAEDIC SURGERY | Admitting: ORTHOPAEDIC SURGERY
Payer: COMMERCIAL

## 2023-07-28 ENCOUNTER — ANESTHESIA (OUTPATIENT)
Dept: SURGERY | Facility: CLINIC | Age: 78
DRG: 454 | End: 2023-07-28
Payer: COMMERCIAL

## 2023-07-28 ENCOUNTER — ANESTHESIA EVENT (OUTPATIENT)
Dept: SURGERY | Facility: CLINIC | Age: 78
DRG: 454 | End: 2023-07-28
Payer: COMMERCIAL

## 2023-07-28 DIAGNOSIS — Z98.1 HISTORY OF FUSION OF LUMBAR SPINE: ICD-10-CM

## 2023-07-28 DIAGNOSIS — G47.33 OSA (OBSTRUCTIVE SLEEP APNEA): ICD-10-CM

## 2023-07-28 DIAGNOSIS — M54.50 LUMBAR PAIN: ICD-10-CM

## 2023-07-28 DIAGNOSIS — G89.18 POSTOPERATIVE PAIN: ICD-10-CM

## 2023-07-28 DIAGNOSIS — Z79.899 HIGH RISK MEDICATION USE: Primary | ICD-10-CM

## 2023-07-28 PROBLEM — M48.061 LUMBAR STENOSIS: Status: ACTIVE | Noted: 2023-07-28

## 2023-07-28 LAB
ABO/RH(D): NORMAL
ANTIBODY SCREEN: NEGATIVE
GLUCOSE BLDC GLUCOMTR-MCNC: 102 MG/DL (ref 70–99)
HGB BLD-MCNC: 11.9 G/DL (ref 11.7–15.7)
SPECIMEN EXPIRATION DATE: NORMAL

## 2023-07-28 PROCEDURE — 250N000013 HC RX MED GY IP 250 OP 250 PS 637: Performed by: ORTHOPAEDIC SURGERY

## 2023-07-28 PROCEDURE — 258N000003 HC RX IP 258 OP 636: Performed by: NURSE ANESTHETIST, CERTIFIED REGISTERED

## 2023-07-28 PROCEDURE — 258N000003 HC RX IP 258 OP 636: Performed by: ORTHOPAEDIC SURGERY

## 2023-07-28 PROCEDURE — 250N000011 HC RX IP 250 OP 636: Performed by: ANESTHESIOLOGY

## 2023-07-28 PROCEDURE — 250N000013 HC RX MED GY IP 250 OP 250 PS 637: Performed by: ANESTHESIOLOGY

## 2023-07-28 PROCEDURE — 250N000025 HC SEVOFLURANE, PER MIN: Performed by: ORTHOPAEDIC SURGERY

## 2023-07-28 PROCEDURE — 272N000001 HC OR GENERAL SUPPLY STERILE: Performed by: ORTHOPAEDIC SURGERY

## 2023-07-28 PROCEDURE — 999N000141 HC STATISTIC PRE-PROCEDURE NURSING ASSESSMENT: Performed by: ORTHOPAEDIC SURGERY

## 2023-07-28 PROCEDURE — 258N000003 HC RX IP 258 OP 636: Performed by: ANESTHESIOLOGY

## 2023-07-28 PROCEDURE — 250N000009 HC RX 250: Performed by: ORTHOPAEDIC SURGERY

## 2023-07-28 PROCEDURE — 01NB3ZZ RELEASE LUMBAR NERVE, PERCUTANEOUS APPROACH: ICD-10-PCS | Performed by: ORTHOPAEDIC SURGERY

## 2023-07-28 PROCEDURE — 0SG13K1 FUSION OF 2 OR MORE LUMBAR VERTEBRAL JOINTS WITH NONAUTOLOGOUS TISSUE SUBSTITUTE, POSTERIOR APPROACH, POSTERIOR COLUMN, PERCUTANEOUS APPROACH: ICD-10-PCS | Performed by: ORTHOPAEDIC SURGERY

## 2023-07-28 PROCEDURE — 272N000002 HC OR SUPPLY OTHER OPNP: Performed by: ORTHOPAEDIC SURGERY

## 2023-07-28 PROCEDURE — 250N000011 HC RX IP 250 OP 636: Mod: JZ | Performed by: ORTHOPAEDIC SURGERY

## 2023-07-28 PROCEDURE — 120N000001 HC R&B MED SURG/OB

## 2023-07-28 PROCEDURE — 999N000179 XR SURGERY CARM FLUORO LESS THAN 5 MIN W STILLS: Mod: TC

## 2023-07-28 PROCEDURE — 710N000010 HC RECOVERY PHASE 1, LEVEL 2, PER MIN: Performed by: ORTHOPAEDIC SURGERY

## 2023-07-28 PROCEDURE — 250N000011 HC RX IP 250 OP 636: Mod: JZ | Performed by: NURSE ANESTHETIST, CERTIFIED REGISTERED

## 2023-07-28 PROCEDURE — 250N000011 HC RX IP 250 OP 636: Mod: JZ | Performed by: ANESTHESIOLOGY

## 2023-07-28 PROCEDURE — 86850 RBC ANTIBODY SCREEN: CPT | Performed by: ORTHOPAEDIC SURGERY

## 2023-07-28 PROCEDURE — 0ST20ZZ RESECTION OF LUMBAR VERTEBRAL DISC, OPEN APPROACH: ICD-10-PCS | Performed by: ORTHOPAEDIC SURGERY

## 2023-07-28 PROCEDURE — 4A11X4G MONITORING OF PERIPHERAL NERVOUS ELECTRICAL ACTIVITY, INTRAOPERATIVE, EXTERNAL APPROACH: ICD-10-PCS | Performed by: ORTHOPAEDIC SURGERY

## 2023-07-28 PROCEDURE — 250N000011 HC RX IP 250 OP 636: Performed by: ORTHOPAEDIC SURGERY

## 2023-07-28 PROCEDURE — C1713 ANCHOR/SCREW BN/BN,TIS/BN: HCPCS | Performed by: ORTHOPAEDIC SURGERY

## 2023-07-28 PROCEDURE — 272N000282 HC OR IOM SUPPLIES OPNP: Performed by: ORTHOPAEDIC SURGERY

## 2023-07-28 PROCEDURE — 360N000084 HC SURGERY LEVEL 4 W/ FLUORO, PER MIN: Performed by: ORTHOPAEDIC SURGERY

## 2023-07-28 PROCEDURE — 250N000009 HC RX 250: Performed by: NURSE ANESTHETIST, CERTIFIED REGISTERED

## 2023-07-28 PROCEDURE — 85018 HEMOGLOBIN: CPT | Performed by: ORTHOPAEDIC SURGERY

## 2023-07-28 PROCEDURE — 0SG13A0 FUSION OF 2 OR MORE LUMBAR VERTEBRAL JOINTS WITH INTERBODY FUSION DEVICE, ANTERIOR APPROACH, ANTERIOR COLUMN, PERCUTANEOUS APPROACH: ICD-10-PCS | Performed by: ORTHOPAEDIC SURGERY

## 2023-07-28 PROCEDURE — 36415 COLL VENOUS BLD VENIPUNCTURE: CPT | Performed by: ORTHOPAEDIC SURGERY

## 2023-07-28 PROCEDURE — 370N000017 HC ANESTHESIA TECHNICAL FEE, PER MIN: Performed by: ORTHOPAEDIC SURGERY

## 2023-07-28 PROCEDURE — 250N000011 HC RX IP 250 OP 636: Mod: JZ

## 2023-07-28 DEVICE — IMP WIRE KIRSCHNER AMENDIA 1.4MMX18" THRD 9080-18T: Type: IMPLANTABLE DEVICE | Site: SPINE LUMBAR | Status: FUNCTIONAL

## 2023-07-28 DEVICE — SCREW SET SPNE STAR OPN LNK PATHLOC-L STRL LF: Type: IMPLANTABLE DEVICE | Site: SPINE LUMBAR | Status: FUNCTIONAL

## 2023-07-28 DEVICE — IMPLANTABLE DEVICE: Type: IMPLANTABLE DEVICE | Site: SPINE LUMBAR | Status: FUNCTIONAL

## 2023-07-28 RX ORDER — NALOXONE HYDROCHLORIDE 0.4 MG/ML
0.4 INJECTION, SOLUTION INTRAMUSCULAR; INTRAVENOUS; SUBCUTANEOUS
Status: DISCONTINUED | OUTPATIENT
Start: 2023-07-28 | End: 2023-08-03 | Stop reason: HOSPADM

## 2023-07-28 RX ORDER — PROCHLORPERAZINE MALEATE 5 MG
5 TABLET ORAL EVERY 6 HOURS PRN
Status: DISCONTINUED | OUTPATIENT
Start: 2023-07-28 | End: 2023-07-28

## 2023-07-28 RX ORDER — NALOXONE HYDROCHLORIDE 0.4 MG/ML
0.2 INJECTION, SOLUTION INTRAMUSCULAR; INTRAVENOUS; SUBCUTANEOUS
Status: DISCONTINUED | OUTPATIENT
Start: 2023-07-28 | End: 2023-08-03 | Stop reason: HOSPADM

## 2023-07-28 RX ORDER — DEXAMETHASONE SODIUM PHOSPHATE 4 MG/ML
4 INJECTION, SOLUTION INTRA-ARTICULAR; INTRALESIONAL; INTRAMUSCULAR; INTRAVENOUS; SOFT TISSUE
Status: DISCONTINUED | OUTPATIENT
Start: 2023-07-28 | End: 2023-07-28 | Stop reason: HOSPADM

## 2023-07-28 RX ORDER — CARBIDOPA AND LEVODOPA 50; 200 MG/1; MG/1
1 TABLET, EXTENDED RELEASE ORAL EVERY 24 HOURS
Status: DISCONTINUED | OUTPATIENT
Start: 2023-07-29 | End: 2023-08-03 | Stop reason: HOSPADM

## 2023-07-28 RX ORDER — SODIUM CHLORIDE 9 MG/ML
INJECTION, SOLUTION INTRAVENOUS CONTINUOUS
Status: DISCONTINUED | OUTPATIENT
Start: 2023-07-28 | End: 2023-07-30

## 2023-07-28 RX ORDER — DEXAMETHASONE SODIUM PHOSPHATE 4 MG/ML
INJECTION, SOLUTION INTRA-ARTICULAR; INTRALESIONAL; INTRAMUSCULAR; INTRAVENOUS; SOFT TISSUE PRN
Status: DISCONTINUED | OUTPATIENT
Start: 2023-07-28 | End: 2023-07-28

## 2023-07-28 RX ORDER — ACETAMINOPHEN 325 MG/1
975 TABLET ORAL EVERY 8 HOURS
Status: COMPLETED | OUTPATIENT
Start: 2023-07-28 | End: 2023-07-31

## 2023-07-28 RX ORDER — LIDOCAINE HYDROCHLORIDE 20 MG/ML
INJECTION, SOLUTION INFILTRATION; PERINEURAL PRN
Status: DISCONTINUED | OUTPATIENT
Start: 2023-07-28 | End: 2023-07-28

## 2023-07-28 RX ORDER — AMOXICILLIN 250 MG
1 CAPSULE ORAL 2 TIMES DAILY
Status: DISCONTINUED | OUTPATIENT
Start: 2023-07-28 | End: 2023-08-03 | Stop reason: HOSPADM

## 2023-07-28 RX ORDER — ACETAMINOPHEN 325 MG/1
975 TABLET ORAL ONCE
Status: CANCELLED | OUTPATIENT
Start: 2023-07-28 | End: 2023-07-28

## 2023-07-28 RX ORDER — GABAPENTIN 100 MG/1
100 CAPSULE ORAL
Status: DISCONTINUED | OUTPATIENT
Start: 2023-07-28 | End: 2023-07-28

## 2023-07-28 RX ORDER — ONDANSETRON 2 MG/ML
4 INJECTION INTRAMUSCULAR; INTRAVENOUS EVERY 6 HOURS PRN
Status: DISCONTINUED | OUTPATIENT
Start: 2023-07-28 | End: 2023-08-03 | Stop reason: HOSPADM

## 2023-07-28 RX ORDER — ONDANSETRON 2 MG/ML
4 INJECTION INTRAMUSCULAR; INTRAVENOUS EVERY 30 MIN PRN
Status: CANCELLED | OUTPATIENT
Start: 2023-07-28

## 2023-07-28 RX ORDER — HYDROMORPHONE HCL IN WATER/PF 6 MG/30 ML
0.2 PATIENT CONTROLLED ANALGESIA SYRINGE INTRAVENOUS
Status: DISCONTINUED | OUTPATIENT
Start: 2023-07-28 | End: 2023-08-03 | Stop reason: HOSPADM

## 2023-07-28 RX ORDER — EPHEDRINE SULFATE 50 MG/ML
INJECTION, SOLUTION INTRAMUSCULAR; INTRAVENOUS; SUBCUTANEOUS PRN
Status: DISCONTINUED | OUTPATIENT
Start: 2023-07-28 | End: 2023-07-28

## 2023-07-28 RX ORDER — ALBUTEROL SULFATE 0.83 MG/ML
2.5 SOLUTION RESPIRATORY (INHALATION) EVERY 4 HOURS PRN
Status: DISCONTINUED | OUTPATIENT
Start: 2023-07-28 | End: 2023-07-28 | Stop reason: HOSPADM

## 2023-07-28 RX ORDER — FLUTICASONE FUROATE AND VILANTEROL 100; 25 UG/1; UG/1
1 POWDER RESPIRATORY (INHALATION) DAILY
Status: DISCONTINUED | OUTPATIENT
Start: 2023-07-29 | End: 2023-08-03 | Stop reason: HOSPADM

## 2023-07-28 RX ORDER — BISACODYL 10 MG
10 SUPPOSITORY, RECTAL RECTAL DAILY PRN
Status: DISCONTINUED | OUTPATIENT
Start: 2023-07-28 | End: 2023-08-03 | Stop reason: HOSPADM

## 2023-07-28 RX ORDER — OXYCODONE HYDROCHLORIDE 5 MG/1
5 TABLET ORAL EVERY 4 HOURS PRN
Status: CANCELLED | OUTPATIENT
Start: 2023-07-28

## 2023-07-28 RX ORDER — METHADONE HYDROCHLORIDE 10 MG/ML
2 INJECTION, SOLUTION INTRAMUSCULAR; INTRAVENOUS; SUBCUTANEOUS ONCE
Status: COMPLETED | OUTPATIENT
Start: 2023-07-28 | End: 2023-07-28

## 2023-07-28 RX ORDER — CARBIDOPA AND LEVODOPA 50; 200 MG/1; MG/1
1 TABLET, EXTENDED RELEASE ORAL AT BEDTIME
Status: DISCONTINUED | OUTPATIENT
Start: 2023-07-28 | End: 2023-08-03 | Stop reason: HOSPADM

## 2023-07-28 RX ORDER — HYDROMORPHONE HCL IN WATER/PF 6 MG/30 ML
0.4 PATIENT CONTROLLED ANALGESIA SYRINGE INTRAVENOUS EVERY 5 MIN PRN
Status: DISCONTINUED | OUTPATIENT
Start: 2023-07-28 | End: 2023-07-28 | Stop reason: HOSPADM

## 2023-07-28 RX ORDER — HYDROMORPHONE HYDROCHLORIDE 1 MG/ML
0.5 INJECTION, SOLUTION INTRAMUSCULAR; INTRAVENOUS; SUBCUTANEOUS
Status: DISCONTINUED | OUTPATIENT
Start: 2023-07-28 | End: 2023-08-01

## 2023-07-28 RX ORDER — ACETAMINOPHEN 325 MG/1
650 TABLET ORAL EVERY 4 HOURS PRN
Status: DISCONTINUED | OUTPATIENT
Start: 2023-07-31 | End: 2023-08-03 | Stop reason: HOSPADM

## 2023-07-28 RX ORDER — PROPOFOL 10 MG/ML
INJECTION, EMULSION INTRAVENOUS PRN
Status: DISCONTINUED | OUTPATIENT
Start: 2023-07-28 | End: 2023-07-28

## 2023-07-28 RX ORDER — LOSARTAN POTASSIUM 50 MG/1
50 TABLET ORAL DAILY
Status: DISCONTINUED | OUTPATIENT
Start: 2023-07-28 | End: 2023-07-29

## 2023-07-28 RX ORDER — LABETALOL HYDROCHLORIDE 5 MG/ML
10 INJECTION, SOLUTION INTRAVENOUS EVERY 10 MIN PRN
Status: DISCONTINUED | OUTPATIENT
Start: 2023-07-28 | End: 2023-07-28 | Stop reason: HOSPADM

## 2023-07-28 RX ORDER — FENTANYL CITRATE 50 UG/ML
50 INJECTION, SOLUTION INTRAMUSCULAR; INTRAVENOUS EVERY 5 MIN PRN
Status: DISCONTINUED | OUTPATIENT
Start: 2023-07-28 | End: 2023-07-28 | Stop reason: HOSPADM

## 2023-07-28 RX ORDER — MEPERIDINE HYDROCHLORIDE 25 MG/ML
12.5 INJECTION INTRAMUSCULAR; INTRAVENOUS; SUBCUTANEOUS EVERY 5 MIN PRN
Status: DISCONTINUED | OUTPATIENT
Start: 2023-07-28 | End: 2023-07-28 | Stop reason: HOSPADM

## 2023-07-28 RX ORDER — LIDOCAINE 40 MG/G
CREAM TOPICAL
Status: DISCONTINUED | OUTPATIENT
Start: 2023-07-28 | End: 2023-08-03 | Stop reason: HOSPADM

## 2023-07-28 RX ORDER — HYDROMORPHONE HCL IN WATER/PF 6 MG/30 ML
0.2 PATIENT CONTROLLED ANALGESIA SYRINGE INTRAVENOUS EVERY 5 MIN PRN
Status: DISCONTINUED | OUTPATIENT
Start: 2023-07-28 | End: 2023-07-28 | Stop reason: HOSPADM

## 2023-07-28 RX ORDER — SODIUM CHLORIDE, SODIUM LACTATE, POTASSIUM CHLORIDE, CALCIUM CHLORIDE 600; 310; 30; 20 MG/100ML; MG/100ML; MG/100ML; MG/100ML
INJECTION, SOLUTION INTRAVENOUS CONTINUOUS PRN
Status: DISCONTINUED | OUTPATIENT
Start: 2023-07-28 | End: 2023-07-28

## 2023-07-28 RX ORDER — ONDANSETRON 2 MG/ML
INJECTION INTRAMUSCULAR; INTRAVENOUS PRN
Status: DISCONTINUED | OUTPATIENT
Start: 2023-07-28 | End: 2023-07-28

## 2023-07-28 RX ORDER — BUPIVACAINE HYDROCHLORIDE 2.5 MG/ML
INJECTION, SOLUTION EPIDURAL; INFILTRATION; INTRACAUDAL PRN
Status: DISCONTINUED | OUTPATIENT
Start: 2023-07-28 | End: 2023-07-28 | Stop reason: HOSPADM

## 2023-07-28 RX ORDER — ROSUVASTATIN CALCIUM 20 MG/1
20 TABLET, COATED ORAL DAILY
Status: DISCONTINUED | OUTPATIENT
Start: 2023-07-29 | End: 2023-08-03 | Stop reason: HOSPADM

## 2023-07-28 RX ORDER — MAGNESIUM HYDROXIDE 1200 MG/15ML
LIQUID ORAL PRN
Status: DISCONTINUED | OUTPATIENT
Start: 2023-07-28 | End: 2023-07-28 | Stop reason: HOSPADM

## 2023-07-28 RX ORDER — KETOROLAC TROMETHAMINE 15 MG/ML
15 INJECTION, SOLUTION INTRAMUSCULAR; INTRAVENOUS EVERY 6 HOURS
Status: COMPLETED | OUTPATIENT
Start: 2023-07-28 | End: 2023-07-29

## 2023-07-28 RX ORDER — LORATADINE 10 MG/1
10 TABLET ORAL DAILY PRN
Status: DISCONTINUED | OUTPATIENT
Start: 2023-07-28 | End: 2023-08-03 | Stop reason: HOSPADM

## 2023-07-28 RX ORDER — CARBIDOPA AND LEVODOPA 25; 100 MG/1; MG/1
3 TABLET ORAL ONCE
Status: COMPLETED | OUTPATIENT
Start: 2023-07-28 | End: 2023-07-28

## 2023-07-28 RX ORDER — OXYCODONE HYDROCHLORIDE 5 MG/1
5 TABLET ORAL EVERY 4 HOURS PRN
Status: DISCONTINUED | OUTPATIENT
Start: 2023-07-28 | End: 2023-07-29

## 2023-07-28 RX ORDER — PROPOFOL 10 MG/ML
INJECTION, EMULSION INTRAVENOUS CONTINUOUS PRN
Status: DISCONTINUED | OUTPATIENT
Start: 2023-07-28 | End: 2023-07-28

## 2023-07-28 RX ORDER — POLYETHYLENE GLYCOL 3350 17 G/17G
17 POWDER, FOR SOLUTION ORAL DAILY
Status: DISCONTINUED | OUTPATIENT
Start: 2023-07-29 | End: 2023-08-03 | Stop reason: HOSPADM

## 2023-07-28 RX ORDER — ONDANSETRON 4 MG/1
4 TABLET, ORALLY DISINTEGRATING ORAL EVERY 30 MIN PRN
Status: DISCONTINUED | OUTPATIENT
Start: 2023-07-28 | End: 2023-07-28 | Stop reason: HOSPADM

## 2023-07-28 RX ORDER — SODIUM CHLORIDE, SODIUM LACTATE, POTASSIUM CHLORIDE, CALCIUM CHLORIDE 600; 310; 30; 20 MG/100ML; MG/100ML; MG/100ML; MG/100ML
INJECTION, SOLUTION INTRAVENOUS CONTINUOUS
Status: DISCONTINUED | OUTPATIENT
Start: 2023-07-28 | End: 2023-07-28 | Stop reason: HOSPADM

## 2023-07-28 RX ORDER — FENTANYL CITRATE 50 UG/ML
INJECTION, SOLUTION INTRAMUSCULAR; INTRAVENOUS PRN
Status: DISCONTINUED | OUTPATIENT
Start: 2023-07-28 | End: 2023-07-28

## 2023-07-28 RX ORDER — NITROGLYCERIN 0.4 MG/1
0.4 TABLET SUBLINGUAL EVERY 5 MIN PRN
Status: DISCONTINUED | OUTPATIENT
Start: 2023-07-28 | End: 2023-08-03 | Stop reason: HOSPADM

## 2023-07-28 RX ORDER — OXYCODONE HYDROCHLORIDE 5 MG/1
10 TABLET ORAL EVERY 4 HOURS PRN
Status: CANCELLED | OUTPATIENT
Start: 2023-07-28

## 2023-07-28 RX ORDER — ONDANSETRON 4 MG/1
4 TABLET, ORALLY DISINTEGRATING ORAL EVERY 30 MIN PRN
Status: CANCELLED | OUTPATIENT
Start: 2023-07-28

## 2023-07-28 RX ORDER — ONDANSETRON 2 MG/ML
4 INJECTION INTRAMUSCULAR; INTRAVENOUS EVERY 30 MIN PRN
Status: DISCONTINUED | OUTPATIENT
Start: 2023-07-28 | End: 2023-07-28 | Stop reason: HOSPADM

## 2023-07-28 RX ORDER — FENTANYL CITRATE 50 UG/ML
25 INJECTION, SOLUTION INTRAMUSCULAR; INTRAVENOUS
Status: CANCELLED | OUTPATIENT
Start: 2023-07-28

## 2023-07-28 RX ORDER — LIDOCAINE 40 MG/G
CREAM TOPICAL
Status: DISCONTINUED | OUTPATIENT
Start: 2023-07-28 | End: 2023-07-28 | Stop reason: HOSPADM

## 2023-07-28 RX ORDER — FENTANYL CITRATE 50 UG/ML
25 INJECTION, SOLUTION INTRAMUSCULAR; INTRAVENOUS EVERY 5 MIN PRN
Status: DISCONTINUED | OUTPATIENT
Start: 2023-07-28 | End: 2023-07-28 | Stop reason: HOSPADM

## 2023-07-28 RX ORDER — CARBIDOPA AND LEVODOPA 25; 100 MG/1; MG/1
3 TABLET ORAL 3 TIMES DAILY
Status: DISCONTINUED | OUTPATIENT
Start: 2023-07-28 | End: 2023-08-03 | Stop reason: HOSPADM

## 2023-07-28 RX ORDER — OXYCODONE HYDROCHLORIDE 10 MG/1
10 TABLET ORAL EVERY 4 HOURS PRN
Status: DISCONTINUED | OUTPATIENT
Start: 2023-07-28 | End: 2023-07-29

## 2023-07-28 RX ORDER — ONDANSETRON 4 MG/1
4 TABLET, ORALLY DISINTEGRATING ORAL EVERY 6 HOURS PRN
Status: DISCONTINUED | OUTPATIENT
Start: 2023-07-28 | End: 2023-08-03 | Stop reason: HOSPADM

## 2023-07-28 RX ORDER — TRANEXAMIC ACID 10 MG/ML
1000 INJECTION, SOLUTION INTRAVENOUS ONCE
Status: COMPLETED | OUTPATIENT
Start: 2023-07-28 | End: 2023-07-28

## 2023-07-28 RX ORDER — TRANEXAMIC ACID 10 MG/ML
5 INJECTION, SOLUTION INTRAVENOUS CONTINUOUS
Status: DISCONTINUED | OUTPATIENT
Start: 2023-07-28 | End: 2023-07-28 | Stop reason: HOSPADM

## 2023-07-28 RX ORDER — ASPIRIN 81 MG/1
81 TABLET, CHEWABLE ORAL DAILY
Status: DISCONTINUED | OUTPATIENT
Start: 2023-07-29 | End: 2023-08-03 | Stop reason: HOSPADM

## 2023-07-28 RX ORDER — OXYCODONE HCL 10 MG/1
10 TABLET, FILM COATED, EXTENDED RELEASE ORAL EVERY 12 HOURS
Status: DISCONTINUED | OUTPATIENT
Start: 2023-07-28 | End: 2023-07-29

## 2023-07-28 RX ORDER — DIAZEPAM 10 MG/2ML
2.5 INJECTION, SOLUTION INTRAMUSCULAR; INTRAVENOUS
Status: DISCONTINUED | OUTPATIENT
Start: 2023-07-28 | End: 2023-07-28 | Stop reason: HOSPADM

## 2023-07-28 RX ORDER — CEFAZOLIN SODIUM/WATER 2 G/20 ML
2 SYRINGE (ML) INTRAVENOUS SEE ADMIN INSTRUCTIONS
Status: DISCONTINUED | OUTPATIENT
Start: 2023-07-28 | End: 2023-07-28 | Stop reason: HOSPADM

## 2023-07-28 RX ORDER — AMOXICILLIN 875 MG
TABLET ORAL
COMMUNITY

## 2023-07-28 RX ORDER — CEFAZOLIN SODIUM/WATER 2 G/20 ML
2 SYRINGE (ML) INTRAVENOUS
Status: COMPLETED | OUTPATIENT
Start: 2023-07-28 | End: 2023-07-28

## 2023-07-28 RX ORDER — GLYCOPYRROLATE 0.2 MG/ML
INJECTION, SOLUTION INTRAMUSCULAR; INTRAVENOUS PRN
Status: DISCONTINUED | OUTPATIENT
Start: 2023-07-28 | End: 2023-07-28

## 2023-07-28 RX ADMIN — CARBIDOPA AND LEVODOPA 1 TABLET: 50; 200 TABLET, EXTENDED RELEASE ORAL at 22:50

## 2023-07-28 RX ADMIN — PHENYLEPHRINE HYDROCHLORIDE 100 MCG: 10 INJECTION INTRAVENOUS at 14:00

## 2023-07-28 RX ADMIN — SODIUM CHLORIDE: 9 INJECTION, SOLUTION INTRAVENOUS at 22:52

## 2023-07-28 RX ADMIN — DEXAMETHASONE SODIUM PHOSPHATE 8 MG: 4 INJECTION, SOLUTION INTRA-ARTICULAR; INTRALESIONAL; INTRAMUSCULAR; INTRAVENOUS; SOFT TISSUE at 13:47

## 2023-07-28 RX ADMIN — HYDROMORPHONE HYDROCHLORIDE 0.4 MG: 0.2 INJECTION, SOLUTION INTRAMUSCULAR; INTRAVENOUS; SUBCUTANEOUS at 18:05

## 2023-07-28 RX ADMIN — SENNOSIDES AND DOCUSATE SODIUM 1 TABLET: 50; 8.6 TABLET ORAL at 22:51

## 2023-07-28 RX ADMIN — PROPOFOL 60 MCG/KG/MIN: 10 INJECTION, EMULSION INTRAVENOUS at 13:53

## 2023-07-28 RX ADMIN — METHADONE HYDROCHLORIDE 2 MG: 10 INJECTION, SOLUTION INTRAMUSCULAR; INTRAVENOUS; SUBCUTANEOUS at 20:37

## 2023-07-28 RX ADMIN — CARBIDOPA AND LEVODOPA 3 TABLET: 25; 100 TABLET ORAL at 12:50

## 2023-07-28 RX ADMIN — GLYCOPYRROLATE 0.2 MG: 0.2 INJECTION, SOLUTION INTRAMUSCULAR; INTRAVENOUS at 13:45

## 2023-07-28 RX ADMIN — FENTANYL CITRATE 50 MCG: 50 INJECTION, SOLUTION INTRAMUSCULAR; INTRAVENOUS at 17:04

## 2023-07-28 RX ADMIN — FENTANYL CITRATE 50 MCG: 50 INJECTION, SOLUTION INTRAMUSCULAR; INTRAVENOUS at 16:55

## 2023-07-28 RX ADMIN — LOSARTAN POTASSIUM 50 MG: 50 TABLET, FILM COATED ORAL at 21:40

## 2023-07-28 RX ADMIN — HYDROMORPHONE HYDROCHLORIDE 0.2 MG: 0.2 INJECTION, SOLUTION INTRAMUSCULAR; INTRAVENOUS; SUBCUTANEOUS at 23:03

## 2023-07-28 RX ADMIN — Medication 2 G: at 13:42

## 2023-07-28 RX ADMIN — HYDROMORPHONE HYDROCHLORIDE 0.4 MG: 0.2 INJECTION, SOLUTION INTRAMUSCULAR; INTRAVENOUS; SUBCUTANEOUS at 17:54

## 2023-07-28 RX ADMIN — LIDOCAINE HYDROCHLORIDE 20 MG: 20 INJECTION, SOLUTION INFILTRATION; PERINEURAL at 13:46

## 2023-07-28 RX ADMIN — SODIUM CHLORIDE, POTASSIUM CHLORIDE, SODIUM LACTATE AND CALCIUM CHLORIDE: 600; 310; 30; 20 INJECTION, SOLUTION INTRAVENOUS at 12:36

## 2023-07-28 RX ADMIN — ROCURONIUM BROMIDE 5 MG: 50 INJECTION, SOLUTION INTRAVENOUS at 13:46

## 2023-07-28 RX ADMIN — KETOROLAC TROMETHAMINE 15 MG: 15 INJECTION, SOLUTION INTRAMUSCULAR; INTRAVENOUS at 18:57

## 2023-07-28 RX ADMIN — HYDROMORPHONE HYDROCHLORIDE 0.5 MG: 1 INJECTION, SOLUTION INTRAMUSCULAR; INTRAVENOUS; SUBCUTANEOUS at 15:31

## 2023-07-28 RX ADMIN — Medication 60 MG: at 13:47

## 2023-07-28 RX ADMIN — SODIUM CHLORIDE, POTASSIUM CHLORIDE, SODIUM LACTATE AND CALCIUM CHLORIDE: 600; 310; 30; 20 INJECTION, SOLUTION INTRAVENOUS at 15:08

## 2023-07-28 RX ADMIN — HYDROMORPHONE HYDROCHLORIDE 0.4 MG: 0.2 INJECTION, SOLUTION INTRAMUSCULAR; INTRAVENOUS; SUBCUTANEOUS at 17:19

## 2023-07-28 RX ADMIN — Medication 5 MG: at 14:25

## 2023-07-28 RX ADMIN — METHADONE HYDROCHLORIDE 2 MG: 10 INJECTION, SOLUTION INTRAMUSCULAR; INTRAVENOUS; SUBCUTANEOUS at 18:52

## 2023-07-28 RX ADMIN — HYDROMORPHONE HYDROCHLORIDE 0.5 MG: 1 INJECTION, SOLUTION INTRAMUSCULAR; INTRAVENOUS; SUBCUTANEOUS at 14:22

## 2023-07-28 RX ADMIN — METHADONE HYDROCHLORIDE 2 MG: 10 INJECTION, SOLUTION INTRAMUSCULAR; INTRAVENOUS; SUBCUTANEOUS at 20:02

## 2023-07-28 RX ADMIN — PHENYLEPHRINE HYDROCHLORIDE 200 MCG: 10 INJECTION INTRAVENOUS at 14:22

## 2023-07-28 RX ADMIN — SODIUM CHLORIDE, POTASSIUM CHLORIDE, SODIUM LACTATE AND CALCIUM CHLORIDE: 600; 310; 30; 20 INJECTION, SOLUTION INTRAVENOUS at 12:24

## 2023-07-28 RX ADMIN — HYDROMORPHONE HYDROCHLORIDE 0.4 MG: 0.2 INJECTION, SOLUTION INTRAMUSCULAR; INTRAVENOUS; SUBCUTANEOUS at 17:09

## 2023-07-28 RX ADMIN — FENTANYL CITRATE 50 MCG: 50 INJECTION, SOLUTION INTRAMUSCULAR; INTRAVENOUS at 14:11

## 2023-07-28 RX ADMIN — DIAZEPAM 2.5 MG: 5 INJECTION INTRAMUSCULAR; INTRAVENOUS at 17:30

## 2023-07-28 RX ADMIN — METHADONE HYDROCHLORIDE 2 MG: 10 INJECTION, SOLUTION INTRAMUSCULAR; INTRAVENOUS; SUBCUTANEOUS at 19:03

## 2023-07-28 RX ADMIN — METHADONE HYDROCHLORIDE 2 MG: 10 INJECTION, SOLUTION INTRAMUSCULAR; INTRAVENOUS; SUBCUTANEOUS at 19:35

## 2023-07-28 RX ADMIN — PROPOFOL 150 MG: 10 INJECTION, EMULSION INTRAVENOUS at 13:46

## 2023-07-28 RX ADMIN — DEXMEDETOMIDINE HYDROCHLORIDE 0.4 MCG/KG/HR: 100 INJECTION, SOLUTION INTRAVENOUS at 13:53

## 2023-07-28 RX ADMIN — ONDANSETRON 4 MG: 2 INJECTION INTRAMUSCULAR; INTRAVENOUS at 16:07

## 2023-07-28 RX ADMIN — TRANEXAMIC ACID 1 G: 10 INJECTION, SOLUTION INTRAVENOUS at 13:42

## 2023-07-28 RX ADMIN — ACETAMINOPHEN 975 MG: 325 TABLET, FILM COATED ORAL at 22:49

## 2023-07-28 RX ADMIN — SODIUM CHLORIDE, POTASSIUM CHLORIDE, SODIUM LACTATE AND CALCIUM CHLORIDE: 600; 310; 30; 20 INJECTION, SOLUTION INTRAVENOUS at 19:20

## 2023-07-28 RX ADMIN — OXYCODONE HYDROCHLORIDE 10 MG: 10 TABLET, FILM COATED, EXTENDED RELEASE ORAL at 22:51

## 2023-07-28 RX ADMIN — FENTANYL CITRATE 50 MCG: 50 INJECTION, SOLUTION INTRAMUSCULAR; INTRAVENOUS at 13:46

## 2023-07-28 RX ADMIN — CARBIDOPA AND LEVODOPA 3 TABLET: 25; 100 TABLET ORAL at 19:50

## 2023-07-28 ASSESSMENT — ACTIVITIES OF DAILY LIVING (ADL)
ADLS_ACUITY_SCORE: 22
ADLS_ACUITY_SCORE: 34
ADLS_ACUITY_SCORE: 22
ADLS_ACUITY_SCORE: 22
ADLS_ACUITY_SCORE: 20
ADLS_ACUITY_SCORE: 22
ADLS_ACUITY_SCORE: 22

## 2023-07-28 ASSESSMENT — COPD QUESTIONNAIRES: COPD: 1

## 2023-07-28 NOTE — ANESTHESIA PREPROCEDURE EVALUATION
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    Arthritis     Cervical cancer (H)     COPD (chronic obstructive pulmonary disease) (H)     Double vision     Gastroesophageal reflux disease     Glaucoma     Headache(784.0)     Heart attack (H)     Hypertension     Other chronic pain     Parkinsons disease (H)     Sleep apnea     no cpap    Sneezing     Stented coronary artery     Stroke (H)     , ,     Subarachnoid hemorrhage (H)       Past Surgical History:   Procedure Laterality Date    aneurysm clipping      brain  and  (Metal-No MRI)    APPENDECTOMY  1970    BACK SURGERY      lumbar spine surgeries x4 Fusions L4 L5 S1    CATARACT IOL, RT/LT Right 2015    with iStent    CATARACT IOL, RT/LT Left     CRANIOTOMY      multiple due to osteomyelitis    HYSTERECTOMY        Allergies   Allergen Reactions    Levofloxacin Anaphylaxis and Other (See Comments)    Tegaderm Transparent Dressing (Informational Only) Rash    Clonidine Unknown and Other (See Comments)     See Care Everywhere      Methotrexate Other (See Comments)    Prednisone Unknown and Other (See Comments)    Sulfasalazine Nausea and Vomiting    Adhesive Tape Rash    Dilantin [Phenytoin] Anxiety    Latex Itching and Rash    Liquid Adhesive Rash     tegaderm    Silver Nitrate Rash      Social History     Tobacco Use    Smoking status: Former     Types: Cigarettes     Quit date: 10/25/2011     Years since quittin.7    Smokeless tobacco: Never   Substance Use Topics    Alcohol use: Yes     Comment: 1-2 drinks per month      Wt Readings from Last 1 Encounters:   23 80.8 kg (178 lb 3.2 oz)        Anesthesia Evaluation   Pt has had prior  anesthetic. Type: General.    No history of anesthetic complications       ROS/MED HX  ENT/Pulmonary:     (+) sleep apnea, doesn't use CPAP,                       COPD,              Neurologic:     (+)          CVA,  without deficits,     Parkinson's disease,               Cardiovascular:     (+)  hypertension- -  CAD -  - stent-                                      METS/Exercise Tolerance:     Hematologic:  - neg hematologic  ROS     Musculoskeletal:       GI/Hepatic:     (+) GERD, Asymptomatic on medication,                  Renal/Genitourinary:  - neg Renal ROS     Endo:  - neg endo ROS     Psychiatric/Substance Use:  - neg psychiatric ROS     Infectious Disease:  - neg infectious disease ROS     Malignancy:  - neg malignancy ROS     Other:  - neg other ROS    (+)  , H/O Chronic Pain,         Physical Exam    Airway        Mallampati: II   TM distance: > 3 FB   Neck ROM: full   Mouth opening: > 3 cm    Respiratory Devices and Support         Dental  no notable dental history     (+) Multiple visibly decayed, broken teeth      Cardiovascular   cardiovascular exam normal          Pulmonary   pulmonary exam normal                OUTSIDE LABS:  CBC:   Lab Results   Component Value Date    WBC 9.2 07/04/2009    WBC 13.2 (H) 06/29/2005    HGB 12.0 07/04/2009    HGB 14.2 06/29/2005    HCT 35.4 07/04/2009    HCT 42.6 06/29/2005     07/04/2009     06/29/2005     BMP:   Lab Results   Component Value Date     07/04/2009     (H) 06/29/2005    POTASSIUM 4.1 07/04/2009    POTASSIUM 4.1 06/29/2005    CHLORIDE 101 07/04/2009    CHLORIDE 104 06/29/2005    CO2 27 07/04/2009    CO2 29 06/29/2005    BUN 19 07/04/2009    BUN 9 06/29/2005    CR 0.80 07/04/2009    CR 0.80 06/29/2005    GLC 99 07/04/2009    GLC 81 06/29/2005     COAGS: No results found for: PTT, INR, FIBR  POC: No results found for: BGM, HCG, HCGS  HEPATIC:   Lab Results   Component Value Date    ALBUMIN 4.0 07/04/2009    PROTTOTAL 7.3  07/04/2009    ALT 18 07/04/2009    AST 40 07/04/2009    ALKPHOS 73 07/04/2009    BILITOTAL 0.3 07/04/2009     OTHER:   Lab Results   Component Value Date    REBECA 8.7 07/04/2009    CRP 0.49 06/29/2005    SED 41 (H) 06/29/2005       Anesthesia Plan    ASA Status:  3    NPO Status:  NPO Appropriate    Anesthesia Type: General.     - Airway: ETT   Induction: Intravenous, Propofol.   Maintenance: Balanced.        Consents    Anesthesia Plan(s) and associated risks, benefits, and realistic alternatives discussed. Questions answered and patient/representative(s) expressed understanding.     - Discussed:     - Discussed with:  Patient            Postoperative Care    Pain management: IV analgesics, Oral pain medications, Multi-modal analgesia.   PONV prophylaxis: Ondansetron (or other 5HT-3), Dexamethasone or Solumedrol     Comments:                Eduar Dozier MD

## 2023-07-28 NOTE — LETTER
Transition Communication Hand-off for Care Transitions to Next Level of Care Provider    Name: Marleen Bobo  : 1945  MRN #: [id number]  Primary Care Provider: [facility]     Primary Clinic: [facility] [address]     Reason for Hospitalization:  Spinal stenosis, lumbar region, with neurogenic claudication [M48.062]  Lumbar stenosis [M48.061]  Admit Date/Time: 2023 10:47 AM  Discharge Date: ***  Payor Source: Payor: MEDICA / Plan: MEDICA [employer] NON/[employer]NERS / Product Type: Indemnity /     Readmission Assessment Measure (THADDEUS) Risk Score/category: ***    Plan of Care Goals/Milestone Events:   Patient Concerns: ***   Patient Goals:   Short-term ***   Long-term ***   Medical Goals   Short-term ***   Long-term ***         Reason for Communication Hand-off Referral: {CCREASONCMCTNHANDOFFREFERRALCTS:65554}    Discharge Plan:       Concern for non-adherence with plan of care:   Y/N ***  Discharge Needs Assessment:  Needs      Flowsheet Row Most Recent Value   Equipment Currently Used at Home cane, straight, grab bar, tub/shower, shower chair, walker, standard            Already enrolled in Tele-monitoring program and name of program:  ***  Follow-up specialty is recommended: {YES / NO:44858}    Follow-up plan:    Future Appointments   Date Time Provider Department Center   8/3/2023  2:00 PM Roxana Henry PT [facility]   2023 11:00 AM Sarah Garcia OTR [facility]   8/15/2023  1:30 PM Ramsey Cole MD [facility]       Any outstanding tests or procedures:              Key Recommendations:      [facility]    AVS/Discharge Summary is the source of truth; this is a helpful guide for improved communication of patient story

## 2023-07-28 NOTE — BRIEF OP NOTE
[facility] Brief Operative Note    Pre-operative diagnosis: Spinal stenosis, lumbar region, with neurogenic claudication [M48.062]   Post-operative diagnosis adjacent level disease and stenosis with claudication L2 to L4      Procedure: Procedure(s):  Lumbar two to Lumbar four interbody posterolateral fusion minimally invasive   Surgeon(s): Surgeon(s) and Role:     * Roger Nation MD - Primary   Estimated blood loss: * No values recorded between 7/28/2023  2:30 PM and 7/28/2023  4:26 PM *    Specimens: * No specimens in log *   Findings: See op note  50cc ebl

## 2023-07-28 NOTE — ANESTHESIA CARE TRANSFER NOTE
Patient: Marleen Bobo    Procedure: Procedure(s):  Lumbar two to Lumbar four interbody posterolateral fusion minimally invasive       Diagnosis: Spinal stenosis, lumbar region, with neurogenic claudication [M48.062]  Diagnosis Additional Information: No value filed.    Anesthesia Type:   General     Note:    Oropharynx: oral airway in place  Level of Consciousness: drowsy  Oxygen Supplementation: face mask  Level of Supplemental Oxygen (L/min / FiO2): 6  Independent Airway: airway patency satisfactory and stable  Dentition: dentition unchanged  Vital Signs Stable: post-procedure vital signs reviewed and stable  Report to RN Given: handoff report given  Patient transferred to: PACU    Handoff Report: Identifed the Patient, Identified the Reponsible Provider, Reviewed the pertinent medical history, Discussed the surgical course, Reviewed Intra-OP anesthesia mangement and issues during anesthesia, Set expectations for post-procedure period and Allowed opportunity for questions and acknowledgement of understanding      Vitals:  Vitals Value Taken Time   /82 07/28/23 1636   Temp     Pulse 59 07/28/23 1638   Resp 11 07/28/23 1638   SpO2 98 % 07/28/23 1638   Vitals shown include unvalidated device data.    Electronically Signed By: TONYA Krishnamurthy CRNA  July 28, 2023  4:39 PM

## 2023-07-28 NOTE — OP NOTE
PRE-PROCEDURE DIAGNOSIS:  1)L2 to L4 stenosis above prior fusion L4 to S1 with claudication     POST-PROCEDURE DIAGNOSIS:  1) Same as above  PROCEDURE PERFORMED:  1) L2to L4 oblique lateral lumbar interbody fusion with discectomy, preparation of the endplate and placement of a bullet cage packed with calcium triphosphate soaked in bone marrow anterior to the transverse process in modified prone position, with intraoperative biplanar fluoroscopic imaging and electrophysiological monitoring.  space and inside the cage  2) L2 to L4 Posterior minimally invasive pedicle screw placement and posterolateral instrumentation and fusion with lnk system with intraoperative biplanar fluoroscopic imaging and electrophysiological monitoring.  3) Transpedicular Bone marrow aspiration  4) Obesity with BMI 30      EBL: 50cc        Surgeon: Roger Varela, PAC     1st assistant needed for patient positioning wound closure and assistance with instrumentation for this complex spine surgery.            HISTORY: Please refer to my clinic note for full details, but in short, patient is a 78 year old female  with above diagnosis not responding to usual conservative therapy. Patient was set up for the surgery as mentioned above and was taken to surgery as mentioned above after all risks and benefits were explained.     PROCEDURE:  The patient was taken to surgery. After general anesthesia was applied, SCDs and Ji placed and preoperative antibiotic given, then patient has been positioned on the Misael table and Daniel frame in a modified prone position for ease of access from the left side.  Ji catheter was placed by a urologist due to his urethral stricture requiring dilatation via cystoscope.      AP and lateral fluoroscopic images are positioned. Patient has been prepped and draped in sterile fashion. The landmarks, including Spinal process, transverse process, disk space, endplates and pedicels are identified and  marked.  A Jamshidi needle is placed in upper right pedicle inside of the vertebral body and bone marrow has been aspirated to be mixed with biologics to introduce Stem cells to the biologics.  Following steps are then taken for levels:                L2 L3 Cag size 11mm high and 33 mm long     L3 L4 Cage size 12mm high and 33 mm long                 Cages went in via the Kambin's triangle at EMG silent window greater than 3 Ma.      The patient was turned using the rotation of the surgical table so that a near direct anterior-lateral approach to the lumbar spine could be achieved. A small incision was then made superior to the mid iliac crest and then using biplanar fluoroscopic visualization, under electrophysiological monitoring and stimulation, we introduced an electrophysiological probe through the retro peritoneal space into the desired discs anterior to the transverse processes and then passed it into the disc space after finding a silent window. The sleeve is retained and the probe is removed, then a K wire was passed sequentially into the disk space. A dilating tube was then passed along this same route. Following this, a working channel was then passed sequentially into the disc spaces. The working channel was manually held in position while a series of disc cleaning tools was passed through the channel to remove the affected discs under clear and direct biplanar fluoroscopic visualization, decompress the nerve roots, and decorticate the vertebral endplates at those segments.  Arthrodesis of the intervertebral spaces via an anterior retroperitoneal exposure and application of an intervertebral biomechanical device was then accomplished by using the working channel that had been placed in the retroperitoneal space anterior to the transverse processes. After adequate decompression and preparation of the endplates, we then put calcium triphosphate soaked in bone marrow anterior into disc space and the working  channel was then removed after a K-Wire was reinserted. Then a interbody was packed tightly with allograft bone for stabilization and arthrodesis of the intervertebral spaces and inserted into the mid portion of the intervertebral discs. This was done under biplanar fluoroscopic visualization. All bone was confined to the borders of the disc space.   The disc space and cage was packed with both small dose INFUSE and bone marrow soaked tricalcium phosphate.   Posterior facets and pars area were also packed with the same after decortication with minimally invasive device.      Physiological Decompression of foramen, lateral recess and spinal canal is achieved by restoring the anatomical distance of inter discal space and increasing inter pedicular distance with interbody graft.     Following steps are then taken for levels:          L2  7.5mm Screw size Right 55 and Left 55     L3  7.5mm Screw size Right 55 Left 55     L4 7.5mm Screw size Right 55 Left 55             Then patient is rotated for a true prone position. Then entry point for the pedicles is identified in the AP and lateral view, and then skin incision has been injected with local anesthetic. Then we entered the pedicle with a Jamshidi needle. Over the Jamshidi needle, we introduced the K wire in Vertebral body.  Additionally we use a small periosteal elevator along the screws to refresh the surface of the bone and facet and put minimal amount of Calcium-triphosphate soaked in bone marrow for additional posterolateral fusion. Over the K wire then , we dilate the muscle with the dilator and then put a pedicle screws bilaterally. Screws are all silent up to   25 MA of stimulation . After screws are all placed, we put rio in place and under fluoroscopic imaging, we locked the rio in place and removed the screw tops and then each incision has been closed with 2-0 Vicryl sutures.     Final ap and lateral  C arm images showed good positioning of all the  hardware  7 incisions were made for this case.  The cage went in about 3 cm incision.  Others were 1 cm to 3Cm  lengths.     Fluroscope time about 3 minutes total using simultaneous C arm imaging.              DISPOSITION: To PACU with postoperative antibiotic. All counts are correct at the end of the surgery.     Roger Nation MD

## 2023-07-28 NOTE — ANESTHESIA POSTPROCEDURE EVALUATION
Patient: Marleen Bobo    Procedure: Procedure(s):  Lumbar two to Lumbar four interbody posterolateral fusion minimally invasive       Anesthesia Type:  General    Note:     Postop Pain Control: Uneventful            Sign Out: Well controlled pain   PONV: No   Neuro/Psych: Uneventful            Sign Out: Acceptable/Baseline neuro status   Airway/Respiratory: Uneventful            Sign Out: Acceptable/Baseline resp. status   CV/Hemodynamics: Uneventful            Sign Out: Acceptable CV status   Other NRE: NONE   DID A NON-ROUTINE EVENT OCCUR? No           Last vitals:  Vitals Value Taken Time   /83 07/28/23 1700   Temp 97.8  F (36.6  C) 07/28/23 1637   Pulse 67 07/28/23 1702   Resp 10 07/28/23 1702   SpO2 94 % 07/28/23 1702   Vitals shown include unvalidated device data.    Electronically Signed By: Eduar Dozier MD  July 28, 2023  5:03 PM

## 2023-07-28 NOTE — PROVIDER NOTIFICATION
Had Gabapentin ordered pre op.  Spoke with Dr. Nation, he does not want this to be given.  Order discontinued.

## 2023-07-29 ENCOUNTER — APPOINTMENT (OUTPATIENT)
Dept: OCCUPATIONAL THERAPY | Facility: CLINIC | Age: 78
DRG: 454 | End: 2023-07-29
Attending: ORTHOPAEDIC SURGERY
Payer: COMMERCIAL

## 2023-07-29 ENCOUNTER — APPOINTMENT (OUTPATIENT)
Dept: PHYSICAL THERAPY | Facility: CLINIC | Age: 78
DRG: 454 | End: 2023-07-29
Attending: ORTHOPAEDIC SURGERY
Payer: COMMERCIAL

## 2023-07-29 LAB
ANION GAP SERPL CALCULATED.3IONS-SCNC: 12 MMOL/L (ref 7–15)
BUN SERPL-MCNC: 21 MG/DL (ref 8–23)
CALCIUM SERPL-MCNC: 8.6 MG/DL (ref 8.8–10.2)
CHLORIDE SERPL-SCNC: 98 MMOL/L (ref 98–107)
CREAT SERPL-MCNC: 0.62 MG/DL (ref 0.51–0.95)
DEPRECATED HCO3 PLAS-SCNC: 25 MMOL/L (ref 22–29)
GFR SERPL CREATININE-BSD FRML MDRD: >90 ML/MIN/1.73M2
GLUCOSE SERPL-MCNC: 136 MG/DL (ref 70–99)
GLUCOSE SERPL-MCNC: 138 MG/DL (ref 70–99)
HGB BLD-MCNC: 11.2 G/DL (ref 11.7–15.7)
POTASSIUM SERPL-SCNC: 4.3 MMOL/L (ref 3.4–5.3)
SODIUM SERPL-SCNC: 135 MMOL/L (ref 136–145)

## 2023-07-29 PROCEDURE — 97530 THERAPEUTIC ACTIVITIES: CPT | Mod: GO

## 2023-07-29 PROCEDURE — 258N000003 HC RX IP 258 OP 636: Performed by: ORTHOPAEDIC SURGERY

## 2023-07-29 PROCEDURE — 97530 THERAPEUTIC ACTIVITIES: CPT | Mod: GP

## 2023-07-29 PROCEDURE — 250N000011 HC RX IP 250 OP 636: Mod: JZ | Performed by: ORTHOPAEDIC SURGERY

## 2023-07-29 PROCEDURE — 258N000003 HC RX IP 258 OP 636: Performed by: INTERNAL MEDICINE

## 2023-07-29 PROCEDURE — 97165 OT EVAL LOW COMPLEX 30 MIN: CPT | Mod: GO

## 2023-07-29 PROCEDURE — 85018 HEMOGLOBIN: CPT | Performed by: ORTHOPAEDIC SURGERY

## 2023-07-29 PROCEDURE — 36415 COLL VENOUS BLD VENIPUNCTURE: CPT | Performed by: INTERNAL MEDICINE

## 2023-07-29 PROCEDURE — 250N000013 HC RX MED GY IP 250 OP 250 PS 637: Performed by: INTERNAL MEDICINE

## 2023-07-29 PROCEDURE — 82947 ASSAY GLUCOSE BLOOD QUANT: CPT | Performed by: INTERNAL MEDICINE

## 2023-07-29 PROCEDURE — 250N000013 HC RX MED GY IP 250 OP 250 PS 637: Performed by: ORTHOPAEDIC SURGERY

## 2023-07-29 PROCEDURE — 120N000001 HC R&B MED SURG/OB

## 2023-07-29 PROCEDURE — 99223 1ST HOSP IP/OBS HIGH 75: CPT | Performed by: INTERNAL MEDICINE

## 2023-07-29 PROCEDURE — 97161 PT EVAL LOW COMPLEX 20 MIN: CPT | Mod: GP

## 2023-07-29 PROCEDURE — 82310 ASSAY OF CALCIUM: CPT | Performed by: INTERNAL MEDICINE

## 2023-07-29 RX ORDER — ALBUTEROL SULFATE 0.83 MG/ML
2.5 SOLUTION RESPIRATORY (INHALATION)
Status: DISCONTINUED | OUTPATIENT
Start: 2023-07-29 | End: 2023-08-03 | Stop reason: HOSPADM

## 2023-07-29 RX ORDER — DEXAMETHASONE SODIUM PHOSPHATE 4 MG/ML
4 INJECTION, SOLUTION INTRA-ARTICULAR; INTRALESIONAL; INTRAMUSCULAR; INTRAVENOUS; SOFT TISSUE EVERY 6 HOURS
Status: COMPLETED | OUTPATIENT
Start: 2023-07-29 | End: 2023-07-30

## 2023-07-29 RX ORDER — OXYCODONE HYDROCHLORIDE 5 MG/1
5 TABLET ORAL EVERY 4 HOURS PRN
Status: DISCONTINUED | OUTPATIENT
Start: 2023-07-29 | End: 2023-08-01

## 2023-07-29 RX ADMIN — KETOROLAC TROMETHAMINE 15 MG: 15 INJECTION, SOLUTION INTRAMUSCULAR; INTRAVENOUS at 14:44

## 2023-07-29 RX ADMIN — DEXAMETHASONE SODIUM PHOSPHATE 4 MG: 4 INJECTION, SOLUTION INTRAMUSCULAR; INTRAVENOUS at 13:36

## 2023-07-29 RX ADMIN — OXYCODONE HYDROCHLORIDE 10 MG: 10 TABLET ORAL at 00:51

## 2023-07-29 RX ADMIN — KETOROLAC TROMETHAMINE 15 MG: 15 INJECTION, SOLUTION INTRAMUSCULAR; INTRAVENOUS at 07:02

## 2023-07-29 RX ADMIN — CARBIDOPA AND LEVODOPA 3 TABLET: 25; 100 TABLET ORAL at 08:24

## 2023-07-29 RX ADMIN — ACETAMINOPHEN 975 MG: 325 TABLET, FILM COATED ORAL at 21:38

## 2023-07-29 RX ADMIN — SENNOSIDES AND DOCUSATE SODIUM 1 TABLET: 50; 8.6 TABLET ORAL at 19:51

## 2023-07-29 RX ADMIN — KETOROLAC TROMETHAMINE 15 MG: 15 INJECTION, SOLUTION INTRAMUSCULAR; INTRAVENOUS at 00:17

## 2023-07-29 RX ADMIN — OXYCODONE HYDROCHLORIDE 10 MG: 10 TABLET ORAL at 08:15

## 2023-07-29 RX ADMIN — SODIUM CHLORIDE: 9 INJECTION, SOLUTION INTRAVENOUS at 17:47

## 2023-07-29 RX ADMIN — SODIUM CHLORIDE 500 ML: 9 INJECTION, SOLUTION INTRAVENOUS at 11:10

## 2023-07-29 RX ADMIN — CARBIDOPA AND LEVODOPA 3 TABLET: 25; 100 TABLET ORAL at 17:44

## 2023-07-29 RX ADMIN — OXYCODONE HYDROCHLORIDE 5 MG: 5 TABLET ORAL at 13:48

## 2023-07-29 RX ADMIN — HYDROMORPHONE HYDROCHLORIDE 0.2 MG: 0.2 INJECTION, SOLUTION INTRAMUSCULAR; INTRAVENOUS; SUBCUTANEOUS at 01:32

## 2023-07-29 RX ADMIN — CARBIDOPA AND LEVODOPA 1 TABLET: 50; 200 TABLET, EXTENDED RELEASE ORAL at 21:37

## 2023-07-29 RX ADMIN — DEXAMETHASONE SODIUM PHOSPHATE 4 MG: 4 INJECTION, SOLUTION INTRAMUSCULAR; INTRAVENOUS at 19:05

## 2023-07-29 RX ADMIN — ACETAMINOPHEN 975 MG: 325 TABLET, FILM COATED ORAL at 13:39

## 2023-07-29 RX ADMIN — ROSUVASTATIN CALCIUM 20 MG: 20 TABLET, FILM COATED ORAL at 08:24

## 2023-07-29 RX ADMIN — ACETAMINOPHEN 975 MG: 325 TABLET, FILM COATED ORAL at 05:42

## 2023-07-29 RX ADMIN — ASPIRIN 81 MG: 81 TABLET, CHEWABLE ORAL at 08:24

## 2023-07-29 RX ADMIN — SENNOSIDES AND DOCUSATE SODIUM 1 TABLET: 50; 8.6 TABLET ORAL at 08:24

## 2023-07-29 RX ADMIN — CARBIDOPA AND LEVODOPA 3 TABLET: 25; 100 TABLET ORAL at 13:39

## 2023-07-29 RX ADMIN — OXYCODONE HYDROCHLORIDE 10 MG: 10 TABLET ORAL at 04:51

## 2023-07-29 RX ADMIN — OXYCODONE HYDROCHLORIDE 2.5 MG: 5 TABLET ORAL at 19:51

## 2023-07-29 RX ADMIN — CARBIDOPA AND LEVODOPA 1 TABLET: 50; 200 TABLET, EXTENDED RELEASE ORAL at 01:34

## 2023-07-29 ASSESSMENT — ACTIVITIES OF DAILY LIVING (ADL)
ADLS_ACUITY_SCORE: 28
ADLS_ACUITY_SCORE: 28
ADLS_ACUITY_SCORE: 34
ADLS_ACUITY_SCORE: 28
ADLS_ACUITY_SCORE: 28
ADLS_ACUITY_SCORE: 34

## 2023-07-29 NOTE — PROGRESS NOTES
07/29/23 0900   Appointment Info   Signing Clinician's Name / Credentials (PT) Khadijah Johnston, PT, DPT   Living Environment   People in Home friend(s);child(brandee), dependent   Current Living Arrangements house   Home Accessibility stairs to enter home   Number of Stairs, Main Entrance 3   Stair Railings, Main Entrance railings safe and in good condition   Transportation Anticipated family or friend will provide   Living Environment Comments Patient reports she lives in a house with a friend and an 11 year old foster child.  There are three steps to enter.  Once inside, patient is able to reside on the main floor.  She sleeps in a standard bed but states she has a bed rail she could install.  She has a tub shower with a shower chair and grab bars.   Self-Care   Usual Activity Tolerance good   Current Activity Tolerance poor   Equipment Currently Used at Home none   Fall history within last six months no   Activity/Exercise/Self-Care Comment Patient reports baseline independence with dressing, bathing, and toileting tasks.  She was ambulating independently but states she does have a front-wheeled walker.   General Information   Onset of Illness/Injury or Date of Surgery 07/28/23   Referring Physician Roger Nation MD   Patient/Family Therapy Goals Statement (PT) Patient wants to remain in bed due to low back pain.   Pertinent History of Current Problem (include personal factors and/or comorbidities that impact the POC) 78 year old female s/p L2 to L4 interbody posterolateral fusion minimally invasive.   Existing Precautions/Restrictions fall;spinal   Cognition   Affect/Mental Status (Cognition) WFL  (Intermittent confusion)   Orientation Status (Cognition) oriented x 3   Follows Commands (Cognition) follows one-step commands;over 90% accuracy;delayed response/completion;increased processing time needed;repetition of directions required   Pain Assessment   Patient Currently in Pain Yes, see Vital Sign  flowsheet  (10/10 low back pain)   Integumentary/Edema   Integumentary/Edema Comments Lumbar incision, dressing intact   Posture    Posture Forward head position;Protracted shoulders   Range of Motion (ROM)   Range of Motion ROM deficits secondary to surgical procedure   ROM Comment Spinal precautions   Strength (Manual Muscle Testing)   Strength (Manual Muscle Testing) Able to perform R SLR;Able to perform L SLR;Deficits observed during functional mobility   Bed Mobility   Comment, (Bed Mobility) Patient needing mod A x1 at trunk for supine > sit transfer with UE support on right bed rail.   Transfers   Comment, (Transfers) Patient unable to perform transfer due to severe low back pain while seated at EOB.   Gait/Stairs (Locomotion)   Comment, (Gait/Stairs) Unable to ambulate.   Balance   Balance Comments Impaired dynamic balance secondary to low back pain and LE weakness   Sensory Examination   Sensory Perception patient reports no sensory changes   Clinical Impression   Criteria for Skilled Therapeutic Intervention Yes, treatment indicated   PT Diagnosis (PT) Impaired functional mobility   Influenced by the following impairments Low back pain, lumbar incision, spinal precautions, generalized weakness and deconditioning, impaired balance   Functional limitations due to impairments Impaired independence with bed mobility, transfers, gait, and stairs.   Clinical Presentation (PT Evaluation Complexity) Stable/Uncomplicated   Clinical Presentation Rationale Clinical judgement, PMH, social support   Clinical Decision Making (Complexity) low complexity   Planned Therapy Interventions (PT) balance training;bed mobility training;cryotherapy;gait training;home exercise program;neuromuscular re-education;patient/family education;postural re-education;stair training;strengthening;transfer training;progressive activity/exercise   Anticipated Equipment Needs at Discharge (PT) walker, rolling   Risk & Benefits of therapy have  been explained evaluation/treatment results reviewed;care plan/treatment goals reviewed;risks/benefits reviewed;participants voiced agreement with care plan;participants included;patient   PT Total Evaluation Time   PT Eval, Low Complexity Minutes (47917) 10   Plan of Care Review   Plan of Care Reviewed With patient   Physical Therapy Goals   PT Frequency 2x/day   PT Predicted Duration/Target Date for Goal Attainment 07/31/23   PT Goals Bed Mobility;Transfers;Gait;Stairs   PT: Bed Mobility Modified independent;Rolling;Supine to/from sit   PT: Transfers Modified independent;Sit to/from stand;Bed to/from chair;Assistive device   PT: Gait Supervision/stand-by assist;100 feet;Rolling walker   PT: Stairs Supervision/stand-by assist;3 stairs;Rail on right   Interventions   Interventions Quick Adds Therapeutic Activity   Therapeutic Activity   Therapeutic Activities: dynamic activities to improve functional performance Minutes (91951) 28   Symptoms Noted During/After Treatment Increased pain;Dizziness   Treatment Detail/Skilled Intervention Patient greeted supine in bed.  On arrival, patient complaining of 10/10 low back pain and wanting assist to reposition in bed.  Patient did receive pain medication prior to PT arrival.  Patient needing encouragement to participate in PT, wanting to stay in bed.  Education on benefits of early mobility.  Patient on 1 L O2 with SpO2 96-97% at rest, weaned to room air with resting SpO2 94%.  Patient cued for ankle pumps, heel slides, and SLRs to promote circulation while in bed.  Educated on logroll technique to facilitate bed mobility within spinal precautions.  With increased time and encouragement, patient able to roll to R sidelying with SBA.  She does require mod A at trunk to come to sitting.  Patient able to maintain sitting position ~4 minutes with SBA, x1 episode of dizziness which quickly resolved.  Intermittent cues for pursed lip breathing as patient tends to hold breath with  pain, SpO2 90-92% while seated EOB.  Patient abruptly attempting to return to bed due to increased low back pain, SBA to complete sit > sidelying transfer.  RN assisting with repositioning higher in bed.  Increased time required to position ice and pillows for patient comfort.  Patient completing multiple R and L rolls in bed with UE support on bed rails, does need encouragement to attempt movement without assistance.  Patient ends session sidelying in bed with RN present, call light in bed and bed alarm activated.   PT Discharge Planning   PT Plan Sitting EOB, sit <> stands, gait as able   PT Discharge Recommendation (DC Rec) home with assist;home with home care physical therapy   PT Rationale for DC Rec Patient currently significantly below her IND prior level of function, morning session very limited by low back pain despite receiving pain medication prior to session.  Patient states she lives at home with a friend and 11 year old foster boy, unclear level of assist available.  Pending improved mobility in IP PT, anticipate discharge home with  PT to progress safety and independence with mobility.  Consider TCU if patient unable to progress to ambulating household distances or completing 3 steps, needed to access home environment.  Anticipate use of FWW at discharge, patient stating she has walker at home.   PT Brief overview of current status Ax1 bed mobility, pain limiting transfer and gait attempts   Total Session Time   Timed Code Treatment Minutes 28   Total Session Time (sum of timed and untimed services) 38

## 2023-07-29 NOTE — CONSULTS
Care Management Initial Consult    General Information  Assessment completed with: Patient, Patient  Type of CM/SW Visit: Initial Assessment    Primary Care Provider verified and updated as needed: Yes   Readmission within the last 30 days: no previous admission in last 30 days      Reason for Consult: care coordination/care conference, discharge planning    Communication Assessment  Patient's communication style: spoken language (English or Bilingual)    Hearing Difficulty or Deaf: no   Wear Glasses or Blind: yes    Cognitive  Cognitive/Neuro/Behavioral: WDL                      Living Environment:   People in home: friend(s), other (see comments) (11 year old foster child)     Current living Arrangements: house      Able to return to prior arrangements: yes       Family/Social Support:  Care provided by: self, friend  Provides care for:    Marital Status:   Children, Other (specify) (friend)          Description of Support System: Supportive, Involved       Current Resources:   Patient receiving home care services: No     Community Resources: None  Equipment currently used at home: cane, straight, grab bar, tub/shower, shower chair, walker, standard  Supplies currently used at home: None    Lifestyle & Psychosocial Needs:  Social Determinants of Health     Tobacco Use: Medium Risk (7/28/2023)    Patient History     Smoking Tobacco Use: Former     Smokeless Tobacco Use: Never     Passive Exposure: Not on file   Alcohol Use: Not on file   Financial Resource Strain: Not on file   Food Insecurity: Not on file   Transportation Needs: Not on file   Physical Activity: Not on file   Stress: Not on file   Social Connections: Not on file   Intimate Partner Violence: Not on file   Depression: At risk (1/7/2020)    PHQ-2     PHQ-2 Score: 3   Housing Stability: Not on file       Functional Status:  Prior to admission patient needed assistance:   Dependent ADLs:: Ambulation-walker    Additional Information:  CM  following for discharge planning. Per chart review, PT worked with patient today and anticipates discharge to home with  PT vs TCU. CM met with patient at bedside who is eager to go home vs TCU. She states she would be open to home health services and has had skilled nursing visits in the past. She reports that she lives in a house that has three stairs to entry but that she otherwise stays on the main level of the home. She states she has a friend that she lives with who offered to assist with her care after discharge. She states her son is also visiting from [place] to help support for a week after she discharges. Family or friend to transport at discharge.     Irma Mccullough RN, BSN  Inpatient Care Coordination  [facility]  [phone]

## 2023-07-29 NOTE — PROGRESS NOTES
Dr. Donaldson and DAYANARA dunlap were at bedside-new IV access was obtained and 500cc bolus started. Remains in trendelenburg for now until B/P improves. Still a little dizzy, but improving. Also spoke with Dr. Donaldson-will keep berg catheter in place today.

## 2023-07-29 NOTE — PROVIDER NOTIFICATION
Page sent to Dr. Donaldson to notify of B/P and that pt now back on post-op IV fluids at 75ml/hr. No further orders at this time. Also clarified that still ok to give IV Decadron.

## 2023-07-29 NOTE — PLAN OF CARE
Goal Outcome Evaluation:             Patient vital signs are at baseline: Yes O 2 at 2 LPM Cap monitoring at sats 90's  Patient able to ambulate as they were prior to admission or with assist devices provided by therapies during their stay:  No,  Reason:  Pt admitted around 2100 still in bed with pain    Patient MUST void prior to discharge:  Yes Pt voids via catheter  Patient able to tolerate oral intake:  Yes Pt on liquids   Pain has adequate pain control using Oral analgesics:  Yes Pt pain controlled with Oxycodone Tylenol. Iv dilaudid.   Log roll, turn, reposition several times for comfort, pillow support and ice pack applied.   Does patient have an identified :  No,     Has goal D/C date and time been discussed with patient:  No,  Reason:  CMS is intact, dressing has small amount of drainage same outline  baseline numbness to lower extremities improving.

## 2023-07-29 NOTE — PROGRESS NOTES
Vitals stable.  Complaining of low back pain.  No leg pain.  Neuro intact.   Sat up in bed.  No ambulation yet.  Ji in.  On oxycontin and oxycodone.  Will Rx some decadron.  PT today and ambulate.  Social work to see for discharge planning.  Hgb. 11.2/

## 2023-07-29 NOTE — PROVIDER NOTIFICATION
Notified by therapy that pt became very hypotensive while trying to sit up. Assisted back to lying and onto her back, placed into trendelenburg. B/P now systolic in the 80's. Pt reports feeling dizzy, no change in vision. Remains alert and talking to staff. Page sent to Dr. Donaldson to notify. Lost IV access-attempting additional IV-flyer RN paged as well.

## 2023-07-29 NOTE — PLAN OF CARE
"BP (!) 156/65 (BP Location: Left arm)   Pulse 58   Temp (!) 96.6  F (35.9  C) (Temporal)   Resp 16   Ht 1.664 m (5' 5.5\")   Wt 80.8 kg (178 lb 3.2 oz)   SpO2 97%   BMI 29.20 kg/m          Patient vital signs are at baseline: Yes  Patient able to ambulate as they were prior to admission or with assist devices provided by therapies during their stay:  No,  Reason:  Bedrest at this time. Plan for PT/OT this AM  Patient MUST void prior to discharge:  Yes  Patient able to tolerate oral intake:  Yes  Pain has adequate pain control using Oral analgesics:  Yes  Does patient have an identified :  Yes, Willa   Has goal D/C date and time been discussed with patient: Yes    A&O x4 w/ some mild intermittent confusion/forgetfulness noted. VSS, on 2L via capno. CMS and neuros intact. Pain managed well w/ sched oxycontin, toradol, tylenol and PRN oxy x2 and IV dilaudid x1 for breakthrough pain. Ice application utilized as well, along with repositioning q2h and more/as requested per pt. Ice off this morning. Pain down from 10/10 to 6/10 w/ pain goal of 4/10. Tolerating diet. Pt had water and jello. IVF stopped. Ji in place, good output w/ plan to remove today. Lower back dressing reinforced w/ abd dressing. Pt had some moderate sanguinous drainage overnight, new boarders marked around 0430, but only visible under abd dressing. Assist of 2. Pt has not been oob at this time, but is able to turn well in bed. Plan for PT/OT to see this morning.                "

## 2023-07-29 NOTE — PROGRESS NOTES
07/29/23 1025   Appointment Info   Signing Clinician's Name / Credentials (OT) Malaika Burks OTR/L   Living Environment   People in Home friend(s);child(brandee), dependent   Current Living Arrangements house   Home Accessibility stairs to enter home   Living Environment Comments Per physical therapy- Patient reports she lives in a house with a friend and an 11 year old foster child. There are three steps to enter. Once inside, patient is able to reside on the main floor.   Self-Care   Usual Activity Tolerance good   Current Activity Tolerance poor   Equipment Currently Used at Home grab bar, tub/shower;shower chair  (She sleeps in a standard bed but states she has a bed rail she could install. She has a tub shower with a shower chair and grab bars. Reacher. Lift chair. Tall bed. Hand held shower head. [place] tub mat )   Fall history within last six months no   Activity/Exercise/Self-Care Comment Pt reports at baseline she is independent in self-cares without gait aid   General Information   Onset of Illness/Injury or Date of Surgery 07/28/23   Referring Physician Roger Nation MD   Patient/Family Therapy Goal Statement (OT) Improve pain   Additional Occupational Profile Info/Pertinent History of Current Problem 78 year old female s/p L2 to L4 interbody posterolateral fusion minimally invasive.   Existing Precautions/Restrictions fall;spinal   Cognitive Status Examination   Affect/Mental Status (Cognitive) flat/blunted affect   Follows Commands follows one-step commands;75-90% accuracy;repetition of directions required;verbal cues/prompting required;increased processing time needed;delayed response/completion   Cognitive Status Comments Continue to monitor   Visual Perception   Visual Impairment/Limitations corrective lenses for reading   Sensory   Sensory Comments Pt denies BUE/BLE numbness or tingling   Pain Assessment   Patient Currently in Pain Yes, see Vital Sign flowsheet   Bed Mobility   Bed Mobility  supine-sit;sit-supine   Supine-Sit [place] (Bed Mobility) moderate assist (50% patient effort)   Sit-Supine [place] (Bed Mobility) 2 person assist   Transfers   Transfers toilet transfer;shower transfer   Shower Transfer   [place] Level (Shower Transfer) 2 person assist   Shower Transfer Comments per clinical judgment   Toilet Transfer   [place] Level (Toilet Transfer) 2 person assist   Clinical Impression   Criteria for Skilled Therapeutic Interventions Met (OT) Yes, treatment indicated   OT Diagnosis Decline function   OT Problem List-Impairments impacting ADL activity tolerance impaired;balance;pain;post-surgical precautions;mobility   Assessment of Occupational Performance 3-5 Performance Deficits   Identified Performance Deficits LB dressing, toileting, bathing, grooming, functional mobility, IADLs   Planned Therapy Interventions (OT) ADL retraining;home program guidelines;progressive activity/exercise;risk factor education;transfer training   Clinical Decision Making Complexity (OT) low complexity   Anticipated Equipment Needs Upon Discharge (OT) reacher;raised toilet seat;dressing equipment   Risk & Benefits of therapy have been explained evaluation/treatment results reviewed;care plan/treatment goals reviewed;risks/benefits reviewed;current/potential barriers reviewed;participants voiced agreement with care plan;participants included;patient   OT Total Evaluation Time   OT Eval, Low Complexity Minutes (47143) 7   OT Goals   Therapy Frequency (OT) Daily   OT Predicted Duration/Target Date for Goal Attainment 08/05/23   OT Goals Lower Body Dressing;Transfers;Toilet Transfer/Toileting;Hygiene/Grooming;OT Goal 1   OT: Hygiene/Grooming modified independent   OT: Lower Body Dressing Modified independent;within precautions;including set-up/clothing retrieval   OT: Transfer Supervision/stand-by assist  (tub/shower transfer)   OT: Toilet Transfer/Toileting Modified independent;cleaning and garment  management;toilet transfer;using adaptive equipment;within precautions   OT: Goal 1 Pt will verbalize understanding of vehicle transfer technique, pain management strategies, fall prevention, how to progress activity tolerance,   Interventions   Interventions Quick Adds Therapeutic Activity   Therapeutic Activities   Therapeutic Activity Minutes (54553) 25   Symptoms noted during/after treatment dizziness;fatigue;increased pain;significant change in vital signs   Treatment Detail/Skilled Intervention Upon arrival pt supine in bed and agreeable to therapy. With much encouragement pt participated in education regarding benefit of sitting in chair and was agreeable. Pt requires significantly increased time and effort for all mobility due to fatigue and weakness. Supine to sit with modA and two cues for log roll technique using bedrail. Pt scooted closer to EOB with Frank, cues for technique, and required four minutes to complete. Sit to stand from EOB with modA and cues for technique using FWW. Assist of 2 for safety. Pt tearful due to pain. Pt reported dizziness and inability to ambulate. Stand to sit with Frank. Unable to achieve BP reading. Pt requested to return to supine. Sit to sidelying with Ax2 and cues for logroll. BP 59/31. RNs immediately notified and addressed. Pt put in trendelenburg. Repeat BP 85/25 then 109/33. Pt's HR and O2 sat on supplemental O2 WNL throughout session. Pt left with RNs.   OT Discharge Planning   OT Plan Go over [employer] packet in her room. Monitor BP. Functional Transfers. LB dressing. Eventual TUb transfer. Decrease freq as needed   OT Discharge Recommendation (DC Rec) home with assist;home with home care occupational therapy;Transitional Care Facility   OT Rationale for DC Rec Pt functionining below baseline and will benefit from continued skilled OT to maximize safety and independence. Pt requiring assist with self-cares primarily due to impairments in pain, fatigue, spinal  precautions. Session limited today due to hypotension. Will continue to update discharge recommendation as pt progresses.   OT Brief overview of current status Stood with Ax2. Became hypotensive- 59/31. Placed in trendelenburg   Total Session Time   Timed Code Treatment Minutes 25   Total Session Time (sum of timed and untimed services) 32

## 2023-07-29 NOTE — CONSULTS
[facility]  Consult Note - Hospitalist Service  Date of Admission:  7/28/2023  Consult Requested by: Dr. Nation  Reason for Consult: Postoperative management.    Assessment & Plan   Marleen Bobo is a 78 year old female admitted on 7/28/2023. She has a past medical history significant for coronary artery disease, stroke, brain aneurysm, hypertension, hyperlipidemia, peripheral arterial disease, COPD, and Parkinson's disease.  She underwent lumbar spine discectomy with interbody fusion surgery on 7/28/2023.  I am asked to help with postoperative management on 7/29/2023.  I did see her just after she had worked with therapy.  She became quite hypotensive while working with therapy.  She currently does feel mildly dizzy.  Continues to have back pain.    Lumbar spine surgery.  -Pain medications as needed.  -Use incentive spirometry.  -Hold off on further therapy until blood pressure improved.    Hypertension.  Postoperative hypotension.  -Hemoglobin today appropriate.  -Suspect current hypotension related to opiates and losartan interacting with recent anesthetics.  -Hold further losartan for now.  -Stop OxyContin.  -Decrease dose of available oxycodone.  -IV fluid bolus 500 cc once now.  -Continue to monitor blood pressures closely.    Parkinson's disease.  -Continue carbidopa/levodopa.    Coronary artery disease.  Previous stroke.  Peripheral arterial disease.  Hyperlipidemia.  -Continue aspirin 81 mg a day.  -Continue rosuvastatin 20 mg a day.  -Hold clopidogrel until okay to restart per spine surgery.  -Hold losartan for now as above.    COPD.  -No acute exacerbation.  -Continue inhaled fluticasone/vilanterol.  -Add albuterol if needed.    Hyponatremia.  -Mild.  Sodium 135.  -Continue IV fluids.  -Recheck metabolic panel tomorrow.    Acute blood loss anemia.  -Postoperative.  -Hemoglobin mildly lower today at 11.2.  -Recheck CBC tomorrow.       Clinically Significant Risk Factors                 "         # Overweight: Estimated body mass index is 29.2 kg/m  as calculated from the following:    Height as of this encounter: 1.664 m (5' 5.5\").    Weight as of this encounter: 80.8 kg (178 lb 3.2 oz)., PRESENT ON ADMISSION          Rd Donaldson DO  Hospitalist Service  Securely message with [employer] (more info)  Text page via [facility] Paging/Directory   ______________________________________________________________________    Chief Complaint   Back pain.    History is obtained from the patient    History of Present Illness   Marleen Bobo is a 78 year old female who has a past medical history significant for coronary artery disease, stroke, brain aneurysm, hypertension, hyperlipidemia, peripheral arterial disease, COPD, and Parkinson's disease.  She underwent lumbar spine discectomy with interbody fusion surgery on 7/28/2023.  I see her today just after she had worked with therapy.  She did develop hypotension during her therapy session.  Became dizzy and lightheaded.  Currently back in bed.  Doing better after she got back in bed.  Still having back pain.  Some nausea.  Denies chest pain, shortness of breath, fevers, chills.  Does note that pain medicine seem to help.  No other acute complaints.      Past Medical History    Past Medical History:   Diagnosis Date    Aneurysm of other specified arteries (H) 1999    cerebral    Arthritis     Cervical cancer (H) 1970    COPD (chronic obstructive pulmonary disease) (H)     Double vision     Gastroesophageal reflux disease     Glaucoma     Headache(784.0)     Heart attack (H)     Hypertension     Other chronic pain     Parkinsons disease (H)     Sleep apnea     no cpap    Sneezing     Stented coronary artery     Stroke (H)     1994, 2003, 2006    Subarachnoid hemorrhage (H) 1994       Past Surgical History   Past Surgical History:   Procedure Laterality Date    aneurysm clipping      brain 1994 and 1999 (Metal-No MRI)    APPENDECTOMY  1970    BACK SURGERY      " lumbar spine surgeries x4 Fusions L4 L5 S1    CATARACT IOL, RT/LT Right 01/14/2015    with iStent    CATARACT IOL, RT/LT Left 2015    CRANIOTOMY      multiple due to osteomyelitis    HYSTERECTOMY         Medications   I have reviewed this patient's current medications       Allergies   Allergies   Allergen Reactions    Levofloxacin Anaphylaxis and Other (See Comments)    Tegaderm Transparent Dressing (Informational Only) Rash    Clonidine Unknown and Other (See Comments)     See Care Everywhere      Methotrexate Other (See Comments)    Prednisone Unknown and Other (See Comments)    Sulfasalazine Nausea and Vomiting    Adhesive Tape Rash    Dilantin [Phenytoin] Anxiety    Latex Itching and Rash    Liquid Adhesive Rash     tegaderm    Silver Nitrate Rash        Physical Exam   Vital Signs: Temp: (!) 96.5  F (35.8  C) Temp src: Temporal BP: (!) 110/38 Pulse: 64   Resp: 18 SpO2: 97 % O2 Device: None (Room air) Oxygen Delivery: 1 LPM  Weight: 178 lbs 3.2 oz    Gen:  NAD, A&O seemingly x3.  Eyes:  PERRL, sclera anicteric.  OP:  MMM, no lesions.  Neck:  Supple.  CV:  Regular, no loud murmurs.  Lung:  CTA b/l, normal effort.  Ab:  +BS, soft.  Skin:  Warm, dry to touch.  No rash.  Ext:  No pitting edema LE b/l.      Medical Decision Making       60 MINUTES SPENT BY ME on the date of service doing chart review, history, exam, documentation & further activities per the note.      Data     I have personally reviewed the following data over the past 24 hrs:    N/A  \   11.2 (L)   / N/A     135 (L) 98 21.0 /  138 (H); 136 (H)   4.3 25 0.62 \       Imaging results reviewed over the past 24 hrs:   Recent Results (from the past 24 hour(s))   XR Surgery LAUREL L/T 5 Min Fluoro w Stills    Narrative    This exam was marked as non-reportable because it will not be read by a   radiologist or a [place] non-radiologist provider.

## 2023-07-29 NOTE — PLAN OF CARE
Goal Outcome Evaluation:      Plan of Care Reviewed With: patient          A&O x4 VSS, with significant hypotension episode this morning during OT eval, BP stable throughout the afternoon. RA. CMS intact, baseline tremors. Pain managed with toradol, tylenol and PRN oxy x2.. Ice applied, frequent repositioning. Tolerating diet. Running NS@75mLs/hr. Ji in place and should stay in at least through today per MD. Dressing was marked and reinforced overnight, no new drainage.  Pt has not been OOB, dangled feet at bedside but became too hypotensive to stand. Therapy will try again this afternoon. Plan is to discharge home with the help of friends and family when medically ready.

## 2023-07-29 NOTE — OR NURSING
Patient's blood pressure is elevated above 160's. MDA visited bedside and suggested the patient take the cozaar ordered for her this evening.  Since she is allergic to clonidine, declined to treat further.

## 2023-07-30 ENCOUNTER — APPOINTMENT (OUTPATIENT)
Dept: CT IMAGING | Facility: CLINIC | Age: 78
DRG: 454 | End: 2023-07-30
Attending: ORTHOPAEDIC SURGERY
Payer: COMMERCIAL

## 2023-07-30 ENCOUNTER — APPOINTMENT (OUTPATIENT)
Dept: PHYSICAL THERAPY | Facility: CLINIC | Age: 78
DRG: 454 | End: 2023-07-30
Attending: ORTHOPAEDIC SURGERY
Payer: COMMERCIAL

## 2023-07-30 ENCOUNTER — APPOINTMENT (OUTPATIENT)
Dept: OCCUPATIONAL THERAPY | Facility: CLINIC | Age: 78
DRG: 454 | End: 2023-07-30
Attending: ORTHOPAEDIC SURGERY
Payer: COMMERCIAL

## 2023-07-30 LAB
ALBUMIN SERPL BCG-MCNC: 3.5 G/DL (ref 3.5–5.2)
ALP SERPL-CCNC: 51 U/L (ref 35–104)
ALT SERPL W P-5'-P-CCNC: 8 U/L (ref 0–50)
ANION GAP SERPL CALCULATED.3IONS-SCNC: 8 MMOL/L (ref 7–15)
AST SERPL W P-5'-P-CCNC: ABNORMAL U/L
BILIRUB SERPL-MCNC: 0.3 MG/DL
BUN SERPL-MCNC: 16.1 MG/DL (ref 8–23)
CALCIUM SERPL-MCNC: 8.5 MG/DL (ref 8.8–10.2)
CHLORIDE SERPL-SCNC: 105 MMOL/L (ref 98–107)
CREAT SERPL-MCNC: 0.45 MG/DL (ref 0.51–0.95)
DEPRECATED HCO3 PLAS-SCNC: 24 MMOL/L (ref 22–29)
ERYTHROCYTE [DISTWIDTH] IN BLOOD BY AUTOMATED COUNT: 13.2 % (ref 10–15)
GFR SERPL CREATININE-BSD FRML MDRD: >90 ML/MIN/1.73M2
GLUCOSE SERPL-MCNC: 139 MG/DL (ref 70–99)
HCT VFR BLD AUTO: 31.7 % (ref 35–47)
HGB BLD-MCNC: 10.4 G/DL (ref 11.7–15.7)
MCH RBC QN AUTO: 30.3 PG (ref 26.5–33)
MCHC RBC AUTO-ENTMCNC: 32.8 G/DL (ref 31.5–36.5)
MCV RBC AUTO: 92 FL (ref 78–100)
PLATELET # BLD AUTO: 207 10E3/UL (ref 150–450)
POTASSIUM SERPL-SCNC: 5.1 MMOL/L (ref 3.4–5.3)
PROT SERPL-MCNC: 6.3 G/DL (ref 6.4–8.3)
RBC # BLD AUTO: 3.43 10E6/UL (ref 3.8–5.2)
SODIUM SERPL-SCNC: 137 MMOL/L (ref 136–145)
WBC # BLD AUTO: 21.4 10E3/UL (ref 4–11)

## 2023-07-30 PROCEDURE — 250N000011 HC RX IP 250 OP 636: Mod: JZ | Performed by: ORTHOPAEDIC SURGERY

## 2023-07-30 PROCEDURE — 250N000013 HC RX MED GY IP 250 OP 250 PS 637: Performed by: ORTHOPAEDIC SURGERY

## 2023-07-30 PROCEDURE — 85027 COMPLETE CBC AUTOMATED: CPT | Performed by: INTERNAL MEDICINE

## 2023-07-30 PROCEDURE — 999N000157 HC STATISTIC RCP TIME EA 10 MIN

## 2023-07-30 PROCEDURE — 250N000013 HC RX MED GY IP 250 OP 250 PS 637: Performed by: INTERNAL MEDICINE

## 2023-07-30 PROCEDURE — 74176 CT ABD & PELVIS W/O CONTRAST: CPT

## 2023-07-30 PROCEDURE — 120N000001 HC R&B MED SURG/OB

## 2023-07-30 PROCEDURE — 97535 SELF CARE MNGMENT TRAINING: CPT | Mod: GO

## 2023-07-30 PROCEDURE — 36415 COLL VENOUS BLD VENIPUNCTURE: CPT | Performed by: INTERNAL MEDICINE

## 2023-07-30 PROCEDURE — 97530 THERAPEUTIC ACTIVITIES: CPT | Mod: GP

## 2023-07-30 PROCEDURE — 94640 AIRWAY INHALATION TREATMENT: CPT

## 2023-07-30 PROCEDURE — 97116 GAIT TRAINING THERAPY: CPT | Mod: GP

## 2023-07-30 PROCEDURE — 258N000003 HC RX IP 258 OP 636: Performed by: ORTHOPAEDIC SURGERY

## 2023-07-30 PROCEDURE — 99232 SBSQ HOSP IP/OBS MODERATE 35: CPT | Performed by: HOSPITALIST

## 2023-07-30 PROCEDURE — 250N000013 HC RX MED GY IP 250 OP 250 PS 637: Performed by: HOSPITALIST

## 2023-07-30 PROCEDURE — 84155 ASSAY OF PROTEIN SERUM: CPT | Performed by: INTERNAL MEDICINE

## 2023-07-30 RX ORDER — CALCIUM CARBONATE 500 MG/1
500 TABLET, CHEWABLE ORAL 3 TIMES DAILY PRN
Status: DISCONTINUED | OUTPATIENT
Start: 2023-07-30 | End: 2023-08-03 | Stop reason: HOSPADM

## 2023-07-30 RX ORDER — LOSARTAN POTASSIUM 50 MG/1
50 TABLET ORAL DAILY
Status: DISCONTINUED | OUTPATIENT
Start: 2023-07-30 | End: 2023-07-31

## 2023-07-30 RX ORDER — CARBIDOPA AND LEVODOPA 25; 100 MG/1; MG/1
2 TABLET ORAL ONCE
Status: COMPLETED | OUTPATIENT
Start: 2023-07-30 | End: 2023-07-30

## 2023-07-30 RX ADMIN — FLUTICASONE FUROATE AND VILANTEROL TRIFENATATE 1 PUFF: 100; 25 POWDER RESPIRATORY (INHALATION) at 08:42

## 2023-07-30 RX ADMIN — HYDROMORPHONE HYDROCHLORIDE 0.2 MG: 0.2 INJECTION, SOLUTION INTRAMUSCULAR; INTRAVENOUS; SUBCUTANEOUS at 20:44

## 2023-07-30 RX ADMIN — SENNOSIDES AND DOCUSATE SODIUM 1 TABLET: 50; 8.6 TABLET ORAL at 09:25

## 2023-07-30 RX ADMIN — OXYCODONE HYDROCHLORIDE 5 MG: 5 TABLET ORAL at 19:43

## 2023-07-30 RX ADMIN — SENNOSIDES AND DOCUSATE SODIUM 1 TABLET: 50; 8.6 TABLET ORAL at 19:43

## 2023-07-30 RX ADMIN — CARBIDOPA AND LEVODOPA 1 TABLET: 50; 200 TABLET, EXTENDED RELEASE ORAL at 03:10

## 2023-07-30 RX ADMIN — CARBIDOPA AND LEVODOPA 1 TABLET: 50; 200 TABLET, EXTENDED RELEASE ORAL at 21:33

## 2023-07-30 RX ADMIN — CARBIDOPA AND LEVODOPA 2 TABLET: 25; 100 TABLET ORAL at 05:14

## 2023-07-30 RX ADMIN — SODIUM CHLORIDE: 9 INJECTION, SOLUTION INTRAVENOUS at 03:11

## 2023-07-30 RX ADMIN — OXYCODONE HYDROCHLORIDE 5 MG: 5 TABLET ORAL at 10:53

## 2023-07-30 RX ADMIN — OXYCODONE HYDROCHLORIDE 5 MG: 5 TABLET ORAL at 15:19

## 2023-07-30 RX ADMIN — ACETAMINOPHEN 975 MG: 325 TABLET, FILM COATED ORAL at 05:15

## 2023-07-30 RX ADMIN — CARBIDOPA AND LEVODOPA 3 TABLET: 25; 100 TABLET ORAL at 17:30

## 2023-07-30 RX ADMIN — ACETAMINOPHEN 975 MG: 325 TABLET, FILM COATED ORAL at 13:10

## 2023-07-30 RX ADMIN — ASPIRIN 81 MG: 81 TABLET, CHEWABLE ORAL at 09:25

## 2023-07-30 RX ADMIN — CARBIDOPA AND LEVODOPA 3 TABLET: 25; 100 TABLET ORAL at 09:25

## 2023-07-30 RX ADMIN — CARBIDOPA AND LEVODOPA 3 TABLET: 25; 100 TABLET ORAL at 13:10

## 2023-07-30 RX ADMIN — OXYCODONE HYDROCHLORIDE 2.5 MG: 5 TABLET ORAL at 03:10

## 2023-07-30 RX ADMIN — CALCIUM CARBONATE (ANTACID) CHEW TAB 500 MG 500 MG: 500 CHEW TAB at 13:07

## 2023-07-30 RX ADMIN — DEXAMETHASONE SODIUM PHOSPHATE 4 MG: 4 INJECTION, SOLUTION INTRAMUSCULAR; INTRAVENOUS at 01:18

## 2023-07-30 RX ADMIN — HYDROMORPHONE HYDROCHLORIDE 0.2 MG: 0.2 INJECTION, SOLUTION INTRAMUSCULAR; INTRAVENOUS; SUBCUTANEOUS at 09:26

## 2023-07-30 RX ADMIN — ROSUVASTATIN CALCIUM 20 MG: 20 TABLET, FILM COATED ORAL at 09:25

## 2023-07-30 RX ADMIN — ACETAMINOPHEN 975 MG: 325 TABLET, FILM COATED ORAL at 21:33

## 2023-07-30 ASSESSMENT — ACTIVITIES OF DAILY LIVING (ADL)
ADLS_ACUITY_SCORE: 28
ADLS_ACUITY_SCORE: 24
ADLS_ACUITY_SCORE: 28
ADLS_ACUITY_SCORE: 24
ADLS_ACUITY_SCORE: 28

## 2023-07-30 NOTE — PROVIDER NOTIFICATION
Valery to update Dr Branch that BP was 160/53, IV fluids were stopped. Asked about an order to remove berg.    Rec'd okay from Dr. Branch to remove berg.

## 2023-07-30 NOTE — PLAN OF CARE
Goal Outcome Evaluation:      Plan of Care Reviewed With: patient    Overall Patient Progress: improvingOverall Patient Progress: improving     Patient vital signs are at baseline: Yes  Patient able to ambulate as they were prior to admission or with assist devices provided by therapies during their stay:  No,  Reason:  Assist x1-2 with walker and gait belt.  Patient MUST void prior to discharge:  No,  Reason:  Berg catheter patent with good output.   Patient able to tolerate oral intake:  Yes  Pain has adequate pain control using Oral analgesics:  Yes  Does patient have an identified :  Yes  Has goal D/C date and time been discussed with patient:  Yes     Pt A/O x4. VSS. PIV infusing. Pain managed with scheduled tylenol and PRN oxycodone. Assist x1-2 with walker and gait belt. When pt ambulating further than bedside becomes weak and leg starts to buckle. Berg catheter patent with good output. Tolerating a regular diet well. CMS at baseline. Dressing CDI. Plan is to discharge to either home with HH PT or TCU.     Pt set off bed alarm once over night. Trying to get oob and saying she needs to go to the bathroom. Educated pt that a berg was in place and draining her urine so she doesn't need to get up and go to the bathroom. Pt seems confused. A/Ox3, disoriented to time. Wanting this writer to remove the IV from the room because it was too loud. Educated pt that Iv needed to stay in room because it was giving pt IV fluids. Pt complaining of tremors. Pageroman GARCIA for extra dose of Sinemet.

## 2023-07-30 NOTE — PLAN OF CARE
Goal Outcome Evaluation:      Plan of Care Reviewed With: patient    Overall Patient Progress: improvingOverall Patient Progress: improving     Pt A/O x4. VSS, Losartan restarted but held due to /39. Pain managed with scheduled tylenol and PRN 5 mg oxycodone, onedose of IV dilaudid this morning. Assist x1 with walker and gait belt. Pt was up in chair and ambulating in the halls. Ji removed. Pt due to void. BS was less than 50mL at 1500. Tolerating a regular diet well. CMS at baseline. Surgical dressing changed. Plan is to discharge to either home with HH PT or TCU.

## 2023-07-30 NOTE — PROGRESS NOTES
[facility]    Medicine Progress Note - Hospitalist Service    Date of Admission:  7/28/2023    Assessment & Plan   Marleen Bobo is a 78 year old female admitted on 7/28/2023. She has a past medical history significant for coronary artery disease, stroke, brain aneurysm, hypertension, hyperlipidemia, peripheral arterial disease, COPD, and Parkinson's disease.  She underwent lumbar spine discectomy with interbody fusion surgery on 7/28/2023.  I am asked to help with postoperative management on 7/29/2023.  I did see her just after she had worked with therapy.  She became quite hypotensive while working with therapy.  She currently does feel mildly dizzy.  Continues to have back pain.     Spinal stenosis s/p surgical intervention  -had lumbar fusion done 7/28  -has been ambulating some, defer pain management to surgery team  -ok to remove berg  -stop fluids     Hypertension  Postoperative hypotension.  -Suspect hypotension related to opiates and losartan interacting with recent anesthetics.  -BP better, ok to resume home cozaar and stop fluids     Bilateral abd pain  -unclear etiology, surgery team has ordered non contrast CT so will follow up  -? Gas pain vs. Hernia but no concerning findings on exam  -pain control    Parkinson's disease.  -Continue carbidopa/levodopa.     Coronary artery disease.  Previous stroke.  Peripheral arterial disease.  Hyperlipidemia.  -Continue aspirin 81 mg a day.  -Continue rosuvastatin 20 mg a day.  -Hold clopidogrel until okay to restart per spine surgery.     COPD.  -No acute exacerbation.  -Continue inhaled fluticasone/vilanterol.  -Add albuterol if needed.     Hyponatremia.  -Mild.  Sodium 135.  -Continue IV fluids.  -Recheck metabolic panel tomorrow.     Acute blood loss anemia.  -Postoperative losses as expected +/- dilutional  -11.9 on admission down to 10.4  -home ASA resumed, monitor     Diet: Advance Diet as Tolerated: Regular Diet Adult    DVT  Prophylaxis: Pneumatic Compression Devices  Ji Catheter: PRESENT, indication: Retention  Lines: None     Cardiac Monitoring: None  Code Status: Full Code      Disposition Plan    Per surgery team, appears may need TCU       Reinaldo Branch DO  Hospitalist Service  [facility]  Securely message with [employer] (more info)  Text page via [employer] | [employer] Paging/Directory   ______________________________________________________________________    Interval History   Reports some bilateral groin pain, unclear etiology. No NV, diarrhea. Has been passing gas but not sure if it helped her abd pain. Surgery team has ordered a CT    Physical Exam   Vital Signs: Temp: 97.2  F (36.2  C) Temp src: Temporal BP: (!) 140/42 Pulse: 71   Resp: 16 SpO2: 95 % O2 Device: None (Room air)    Weight: 178 lbs 3.2 oz    Constitutional: awake, alert, and cooperative  Eyes: pupils equal, round and reactive to light and conjunctiva normal  ENT: normocepalic, without obvious abnormality, atramatic  Respiratory: no increased work of breathing, good air exchange, and clear to auscultation  Cardiovascular: regular rate and rhythm and no murmur noted  GI: active bowel sounds, no pain to palpation of abdomen generally but some discomfort in inguinal area bilaterally. No obvious hernia but difficult exam. No rebound  Skin: surgical site covered, no rashes or bruising noted  Neurologic: alert, moving all extremities    45 MINUTES SPENT BY ME on the date of service doing chart review, history, exam, documentation & further activities per the note.      Data   ------------------------- PAST 24 HR DATA REVIEWED -----------------------------------------------    I have personally reviewed the following data over the past 24 hrs:    21.4 (H)  \   10.4 (L)   / 207     137 105 16.1 /  139 (H)   5.1 24 0.45 (L) \     ALT: 8 AST: N/A AP: 51 TBILI: 0.3   ALB: 3.5 TOT PROTEIN: 6.3 (L) LIPASE: N/A       Imaging results reviewed over the past 24 hrs:    No results found for this or any previous visit (from the past 24 hour(s)).

## 2023-07-30 NOTE — PLAN OF CARE
Goal Outcome Evaluation:  Vital signs stable, on Room air, sats 90's.  Lungs clear.  Cms Intact, mild numbness in feet.  Pt ambulated short distance in tirado with 2 assist, walker and gait belt, right leg buckled x1.  Encouraged po intake, inspirometer use,and ankle pump exercises.  Log rolled to turn, reposition with assist.  Dressing intact.  Hgb 11.2.  Pain controlled wit tylenol, oxycodone, ice pack application.    Plan of Care Reviewed With: patient

## 2023-07-30 NOTE — PROGRESS NOTES
Vitals stable.   Stood up.  Low back pain better.  Bilateral lower quadrant pain.  No hip pain.  Passing gas.  Check CT of abdomen without contrast.  More PT.  Most likely TCU discharge once stable and ambulatory.

## 2023-07-30 NOTE — PROVIDER NOTIFICATION
"0505: provider notified that Pt received all scheduled doses of Sinemet, still complaining of tremors. Pt requesting another dose of Sinemet. Stating \"when this happens at home I take another dose so I want another dose here\".    One time dose of sinemet  ordered  "

## 2023-07-31 ENCOUNTER — APPOINTMENT (OUTPATIENT)
Dept: GENERAL RADIOLOGY | Facility: CLINIC | Age: 78
DRG: 454 | End: 2023-07-31
Attending: HOSPITALIST
Payer: COMMERCIAL

## 2023-07-31 LAB
ANION GAP SERPL CALCULATED.3IONS-SCNC: 8 MMOL/L (ref 7–15)
BUN SERPL-MCNC: 15.6 MG/DL (ref 8–23)
CALCIUM SERPL-MCNC: 8.4 MG/DL (ref 8.8–10.2)
CHLORIDE SERPL-SCNC: 102 MMOL/L (ref 98–107)
CREAT SERPL-MCNC: 0.53 MG/DL (ref 0.51–0.95)
DEPRECATED HCO3 PLAS-SCNC: 29 MMOL/L (ref 22–29)
ERYTHROCYTE [DISTWIDTH] IN BLOOD BY AUTOMATED COUNT: 13.4 % (ref 10–15)
ERYTHROCYTE [DISTWIDTH] IN BLOOD BY AUTOMATED COUNT: 13.5 % (ref 10–15)
GFR SERPL CREATININE-BSD FRML MDRD: >90 ML/MIN/1.73M2
GLUCOSE BLDC GLUCOMTR-MCNC: 109 MG/DL (ref 70–99)
GLUCOSE SERPL-MCNC: 114 MG/DL (ref 70–99)
HCT VFR BLD AUTO: 29.8 % (ref 35–47)
HCT VFR BLD AUTO: 30.3 % (ref 35–47)
HGB BLD-MCNC: 9.5 G/DL (ref 11.7–15.7)
HGB BLD-MCNC: 9.6 G/DL (ref 11.7–15.7)
MCH RBC QN AUTO: 30 PG (ref 26.5–33)
MCH RBC QN AUTO: 30.3 PG (ref 26.5–33)
MCHC RBC AUTO-ENTMCNC: 31.7 G/DL (ref 31.5–36.5)
MCHC RBC AUTO-ENTMCNC: 31.9 G/DL (ref 31.5–36.5)
MCV RBC AUTO: 94 FL (ref 78–100)
MCV RBC AUTO: 96 FL (ref 78–100)
PLATELET # BLD AUTO: 195 10E3/UL (ref 150–450)
PLATELET # BLD AUTO: 201 10E3/UL (ref 150–450)
POTASSIUM SERPL-SCNC: 3.6 MMOL/L (ref 3.4–5.3)
RBC # BLD AUTO: 3.17 10E6/UL (ref 3.8–5.2)
RBC # BLD AUTO: 3.17 10E6/UL (ref 3.8–5.2)
SODIUM SERPL-SCNC: 139 MMOL/L (ref 136–145)
TROPONIN T SERPL HS-MCNC: 19 NG/L
TROPONIN T SERPL HS-MCNC: 20 NG/L
WBC # BLD AUTO: 13.8 10E3/UL (ref 4–11)
WBC # BLD AUTO: 16.8 10E3/UL (ref 4–11)

## 2023-07-31 PROCEDURE — 250N000011 HC RX IP 250 OP 636: Performed by: ORTHOPAEDIC SURGERY

## 2023-07-31 PROCEDURE — 36415 COLL VENOUS BLD VENIPUNCTURE: CPT | Performed by: HOSPITALIST

## 2023-07-31 PROCEDURE — 71045 X-RAY EXAM CHEST 1 VIEW: CPT

## 2023-07-31 PROCEDURE — 99233 SBSQ HOSP IP/OBS HIGH 50: CPT | Performed by: INTERNAL MEDICINE

## 2023-07-31 PROCEDURE — 250N000013 HC RX MED GY IP 250 OP 250 PS 637: Performed by: HOSPITALIST

## 2023-07-31 PROCEDURE — 120N000001 HC R&B MED SURG/OB

## 2023-07-31 PROCEDURE — 80048 BASIC METABOLIC PNL TOTAL CA: CPT | Performed by: HOSPITALIST

## 2023-07-31 PROCEDURE — 250N000013 HC RX MED GY IP 250 OP 250 PS 637: Performed by: INTERNAL MEDICINE

## 2023-07-31 PROCEDURE — 85027 COMPLETE CBC AUTOMATED: CPT | Performed by: HOSPITALIST

## 2023-07-31 PROCEDURE — 84484 ASSAY OF TROPONIN QUANT: CPT | Performed by: HOSPITALIST

## 2023-07-31 PROCEDURE — 250N000013 HC RX MED GY IP 250 OP 250 PS 637

## 2023-07-31 PROCEDURE — 999N000157 HC STATISTIC RCP TIME EA 10 MIN

## 2023-07-31 PROCEDURE — 94640 AIRWAY INHALATION TREATMENT: CPT

## 2023-07-31 PROCEDURE — 250N000013 HC RX MED GY IP 250 OP 250 PS 637: Performed by: ORTHOPAEDIC SURGERY

## 2023-07-31 RX ORDER — LOSARTAN POTASSIUM 50 MG/1
50 TABLET ORAL DAILY
Status: DISCONTINUED | OUTPATIENT
Start: 2023-08-01 | End: 2023-08-03 | Stop reason: HOSPADM

## 2023-07-31 RX ORDER — NITROGLYCERIN 0.4 MG/1
TABLET SUBLINGUAL
Status: COMPLETED
Start: 2023-07-31 | End: 2023-07-31

## 2023-07-31 RX ADMIN — HYDROMORPHONE HYDROCHLORIDE 0.5 MG: 1 INJECTION, SOLUTION INTRAMUSCULAR; INTRAVENOUS; SUBCUTANEOUS at 04:56

## 2023-07-31 RX ADMIN — HYDROMORPHONE HYDROCHLORIDE 0.5 MG: 1 INJECTION, SOLUTION INTRAMUSCULAR; INTRAVENOUS; SUBCUTANEOUS at 13:19

## 2023-07-31 RX ADMIN — LOSARTAN POTASSIUM 12.5 MG: 25 TABLET, FILM COATED ORAL at 09:23

## 2023-07-31 RX ADMIN — HYDROMORPHONE HYDROCHLORIDE 0.5 MG: 1 INJECTION, SOLUTION INTRAMUSCULAR; INTRAVENOUS; SUBCUTANEOUS at 08:44

## 2023-07-31 RX ADMIN — OXYCODONE HYDROCHLORIDE 5 MG: 5 TABLET ORAL at 11:49

## 2023-07-31 RX ADMIN — LOSARTAN POTASSIUM 37.5 MG: 25 TABLET, FILM COATED ORAL at 13:21

## 2023-07-31 RX ADMIN — FLUTICASONE FUROATE AND VILANTEROL TRIFENATATE 1 PUFF: 100; 25 POWDER RESPIRATORY (INHALATION) at 08:14

## 2023-07-31 RX ADMIN — CALCIUM CARBONATE (ANTACID) CHEW TAB 500 MG 500 MG: 500 CHEW TAB at 01:08

## 2023-07-31 RX ADMIN — CARBIDOPA AND LEVODOPA 1 TABLET: 50; 200 TABLET, EXTENDED RELEASE ORAL at 01:07

## 2023-07-31 RX ADMIN — NITROGLYCERIN: 0.4 TABLET SUBLINGUAL at 16:42

## 2023-07-31 RX ADMIN — ASPIRIN 81 MG: 81 TABLET, CHEWABLE ORAL at 08:45

## 2023-07-31 RX ADMIN — SENNOSIDES AND DOCUSATE SODIUM 1 TABLET: 50; 8.6 TABLET ORAL at 20:09

## 2023-07-31 RX ADMIN — ACETAMINOPHEN 975 MG: 325 TABLET, FILM COATED ORAL at 07:07

## 2023-07-31 RX ADMIN — ROSUVASTATIN CALCIUM 20 MG: 20 TABLET, FILM COATED ORAL at 08:45

## 2023-07-31 RX ADMIN — CARBIDOPA AND LEVODOPA 3 TABLET: 25; 100 TABLET ORAL at 08:44

## 2023-07-31 RX ADMIN — CARBIDOPA AND LEVODOPA 3 TABLET: 25; 100 TABLET ORAL at 18:08

## 2023-07-31 RX ADMIN — POLYETHYLENE GLYCOL 3350 17 G: 17 POWDER, FOR SOLUTION ORAL at 08:46

## 2023-07-31 RX ADMIN — OXYCODONE HYDROCHLORIDE 5 MG: 5 TABLET ORAL at 18:59

## 2023-07-31 RX ADMIN — ACETAMINOPHEN 975 MG: 325 TABLET, FILM COATED ORAL at 13:20

## 2023-07-31 RX ADMIN — CARBIDOPA AND LEVODOPA 3 TABLET: 25; 100 TABLET ORAL at 13:21

## 2023-07-31 RX ADMIN — MAGNESIUM HYDROXIDE 30 ML: 400 SUSPENSION ORAL at 15:42

## 2023-07-31 RX ADMIN — HYDROMORPHONE HYDROCHLORIDE 0.5 MG: 1 INJECTION, SOLUTION INTRAMUSCULAR; INTRAVENOUS; SUBCUTANEOUS at 22:57

## 2023-07-31 RX ADMIN — HYDROMORPHONE HYDROCHLORIDE 0.5 MG: 1 INJECTION, SOLUTION INTRAMUSCULAR; INTRAVENOUS; SUBCUTANEOUS at 20:09

## 2023-07-31 RX ADMIN — OXYCODONE HYDROCHLORIDE 5 MG: 5 TABLET ORAL at 01:06

## 2023-07-31 RX ADMIN — CARBIDOPA AND LEVODOPA 1 TABLET: 50; 200 TABLET, EXTENDED RELEASE ORAL at 22:16

## 2023-07-31 RX ADMIN — ACETAMINOPHEN 650 MG: 325 TABLET, FILM COATED ORAL at 01:06

## 2023-07-31 RX ADMIN — SENNOSIDES AND DOCUSATE SODIUM 1 TABLET: 50; 8.6 TABLET ORAL at 08:45

## 2023-07-31 ASSESSMENT — ACTIVITIES OF DAILY LIVING (ADL)
ADLS_ACUITY_SCORE: 28

## 2023-07-31 NOTE — PROGRESS NOTES
DAILY PROGRESS NOTE    Marleen Bobo is a 78 year old old female admitted on 7/28/2023 10:47 AM.    Subjective  Marleen Bobo is a 78 year old female who is POD3 from a L2-L4 OLLIF. Pt states that she has been ambulating down the hallways at the hospital without any concerns. She reports some right thigh pain post-operatively, but states that she is overall doing well from a surgical side of things.  Patient reports a sharp abdominal discomfort. Pt voices concerns regarding the possibility of a UTI. Pt denies any urinary symptoms such as urgency, frequency or pain with urination.     Objective   AAOx3, CORTES , f/c 4/4   Ambulating,     Hemoglobin   Date Value Ref Range Status   07/31/2023 9.6 (L) 11.7 - 15.7 g/dL Final   07/04/2009 12.0 11.7 - 15.7 g/dL Final   ]      Impression / Plan  Patient is doing well from a surgical aspect.    Plan for today:    Continue to Ambulate with help  PT OT, possibly clearance   Full diet  Wean and d/c PCA and transition to PO meds today

## 2023-07-31 NOTE — PROGRESS NOTES
RRT called overhead.  I immediately went to the bedside.  Primary daytime MD also at bedside.  Patient had 10 to 15 minutes of chest pain in the middle of her chest.  She described it as sharp.  She has known CAD tells me she had stents placed about a year ago.  She does take nitroglycerin at home for periodic chest pain and last took nitroglycerin about 3 weeks ago.    Vitals:    07/31/23 0840 07/31/23 0900 07/31/23 1554 07/31/23 1634   BP: (!) 184/64 (!) 180/59 (!) 143/46 (!) 155/78   BP Location: Left arm Left arm Right arm Left arm   Cuff Size:  Adult Regular     Pulse: 72 84 62    Resp:  18 12    Temp:   97.2  F (36.2  C) 97.3  F (36.3  C)   TempSrc:   Temporal Temporal   SpO2: 95%  92% 98%   Weight:       Height:         On exam, patient is lying in bed.  Conversational.  Not in distress.  Heart is regular with systolic murmur noted.  Lungs are clear bilaterally.  She is on 3 to 4 L of oxygen but saturating in the high 90s.  Extremities are warm.    Chart reviewed.  Patient is now postop day 3 from an L2-L4 lumbar fusion.  Noted that she had some postoperative hypotension but now is on the hypertensive side.  Losartan was resumed this afternoon.    A/P:  Substernal chest pain in the postoperative setting status post lumbar fusion.  Primary MD also notes she has had sort of diffuse pain through the day.  She had abdominal pain yesterday which a CT abdomen pelvis was done that showed postoperative seroma or hematoma.    Her CBC from this AM shows an improving leukocytosis and hemoglobin of 9.6 from 10.4 yesterday.    EKG obtained shows sinus rhythm with a bifascicular block which was noted on her EKG uploaded on 7/26.  No clear ST segment elevation.    Through the course of evaluation, patient's chest discomfort seem to be improving.    -Will trend troponins.  Check portable chest x-ray.  Monitor on telemetry.  Sublingual nitroglycerin available for chest pain which she also takes at home.  We will need to make  sure blood pressure is better controlled as this may be adding additional strain on her heart.    Addendum: Pt re-seen. Chest pain gone. Relieved with NTG.  HS troponin not significantly elevated at 19.  CXR without acute CP process.     Manny Mayes MD

## 2023-07-31 NOTE — PROGRESS NOTES
Patient referred by RN for Healing Touch due to pain and anxiety.  Healing Touch explained to patient and patient consented to HT session.  Pain prior to session 10/10 anxiety prior to session 10/10. Performed HT with music.  Pain post session not measured but comfortable. anxiety not measured but relaxed. post session. Time spent with patient 60 minutes..    Carlos ARMAS RN-BC HNB-BC HTCP

## 2023-07-31 NOTE — PLAN OF CARE
"Goal Outcome Evaluation:      Plan of Care Reviewed With: patient      Patient vital signs are at baseline: Yes  Patient able to ambulate as they were prior to admission or with assist devices provided by therapies during their stay:  No,  Reason:  Refused has a lot of pain in her back, repo @1 hr  Patient MUST void prior to discharge:  Yes  Patient able to tolerate oral intake:  Yes  Pain has adequate pain control using Oral analgesics:  No,  Reason:  asking IV Dilaudid,and has a lot of pain.  Does patient have an identified :  No,  Reason:  needs TCU placement  Has goal D/C date and time been discussed with patient:  No,  Reason:   TBD    BP (!) 180/59 (BP Location: Left arm, Cuff Size: Adult Regular)   Pulse 84   Temp 97.8  F (36.6  C) (Temporal)   Resp 18   Ht 1.664 m (5' 5.5\")   Wt 80.8 kg (178 lb 3.2 oz)   SpO2 95%   BMI 29.20 kg/m               "

## 2023-07-31 NOTE — PROGRESS NOTES
Pt complains of chest pain that is sharp at 1630. Charge nurse notified, RRT called at 1632. VS taken, O2 started at 3 lpm, then 4 liter. EKG, labs completed. Nitroglycerin given. Bladder scan 0 mL, . RRT ended 1645. Pt stabilized in room, no distress noted. Will continue to monitor.

## 2023-07-31 NOTE — PROGRESS NOTES
[facility]    Medicine Progress Note - Hospitalist Service    Date of Admission:  7/28/2023    Assessment & Plan   Marleen Bobo is a 78 year old female admitted on 7/28/2023. She has a past medical history significant for coronary artery disease, stroke, brain aneurysm, hypertension, hyperlipidemia, peripheral arterial disease, COPD, and Parkinson's disease.  She underwent lumbar spine discectomy with interbody fusion surgery on 7/28/2023.  She became quite hypotensive while working with therapy/had dizziness earlier in stay.    Spinal stenosis s/p surgical intervention  -had lumbar fusion done 7/28  -has been ambulating some, defer pain management to surgery team  -Ji removed recently, check bladder scan today.  Patient reports voiding well in her perception.     Hypertension  Postoperative hypotension:  -Suspect hypotension related to opiates and losartan interacting with recent anesthetics.  -BP climbing today, will resume full losartan.       Bilateral abd pain  Constipation:  -unclear etiology, surgery team has ordered non contrast CT so will follow up  -? Gas pain/Constipation discomfort vs. Hernia but no concerning findings on exam.  ? Related to back.  -pain control, bowel regimen.  Check bladder scan as above to r/o retention.     Parkinson's disease:  -Continue carbidopa/levodopa.     Coronary artery disease.  Previous stroke.  Peripheral arterial disease.  Hyperlipidemia:  -Continue aspirin 81 mg a day.  -Continue rosuvastatin 20 mg a day.  -Hold clopidogrel until okay to restart per spine surgery.  I will try to check with them today to clarify when this can be resumed.    ADDENDUM 1750 following episode of CP (see Dr. Mayes note):  Attempted to clarify with spine surgery, have not yet heard back from them.  Patient pain free, troponin only slightly above normal range.  Will trend troponin.  If more chest pain/trop markedly increases would consider resuming plavix and/or  "heparin drip later this evening in consultation with spine surgery.  If she continues feeling well will continue ASA and will likely resume plavix in AM when able to further discuss with spine team.     COPD:  -No acute exacerbation.  -Continue inhaled fluticasone/vilanterol.  -Add albuterol if needed.     Hyponatremia:  -Mild.  Sodium 135.  -Continue IV fluids.  -Recheck metabolic panel tomorrow.     Acute blood loss anemia.  -Postoperative losses as expected +/- dilutional  -home ASA resumed, monitor    Medical Decision Making       Over 50 MINUTES SPENT BY ME on the date of service doing chart review, history, exam, documentation & further activities per the note.      Labs/Imaging Reviewed:  See Information above and Data section below    PPE Used:  Mask       Diet: Advance Diet as Tolerated: Regular Diet Adult    DVT Prophylaxis: Pneumatic Compression Devices  Ji Catheter: Not present  Lines: None     Cardiac Monitoring: None  Code Status: Full Code      Clinically Significant Risk Factors                         # Overweight: Estimated body mass index is 29.2 kg/m  as calculated from the following:    Height as of this encounter: 1.664 m (5' 5.5\").    Weight as of this encounter: 80.8 kg (178 lb 3.2 oz)., PRESENT ON ADMISSION          Disposition Plan      Expected Discharge Date: 08/02/2023      Destination: home with help/services;home with family            Johnie De Santiago, DO  Hospitalist Service  [facility]  Securely message with [employer] (more info)  Text page via [facility] Paging/Directory   ______________________________________________________________________    Interval History   Assumed consultative care, history reviewed.  Patient reports back pain better but still some bilateral groin discomfort.  Also reports some intermittent mild thigh numbness though better when I saw her.  No chest pain, sob, nausea.  No further dizziness.    Physical Exam   Vital Signs: Temp: 97.8  F " (36.6  C) Temp src: Temporal BP: (!) 180/59 Pulse: 84   Resp: 18 SpO2: 95 % O2 Device: None (Room air)    Weight: 178 lbs 3.2 oz    GEN:  Alert, oriented x 3, appears comfortable, no overt distress.  HEENT:  Normocephalic/atraumatic, no scleral icterus, no nasal discharge, mouth moist.  CV:  Regular rate and rhythm, no loud murmur/rub.  LUNGS:  Clear to auscultation bilaterally without rales/rhonchi/wheezing/retractions.  Breath sounds mildly diminished bases.  Symmetric chest rise on inhalation noted.  ABD:  Active bowel sounds, soft, Mild bilateral lower quadrant tenderness, mildly distended.  No guarding/rigidity.  EXT:  Trace LE edema.  No cyanosis.  No acute joint synovitis noted.  SKIN:  Dry to touch, no exanthems noted in the visualized areas.  NEURO:  Moving legs well on request, sensation to touch grossly intact though patient reports some subjective thigh numbness at times.    Medications      acetaminophen  975 mg Oral Q8H    aspirin  81 mg Oral Daily    carbidopa-levodopa  1 tablet Oral At Bedtime    carbidopa-levodopa  1 tablet Oral Q24H    carbidopa-levodopa  3 tablet Oral TID    [Held by provider] etanercept  50 mg Subcutaneous Weekly    fluticasone-vilanterol  1 puff Inhalation Daily    losartan  37.5 mg Oral Once    [START ON 8/1/2023] losartan  50 mg Oral Daily    polyethylene glycol  17 g Oral Daily    rosuvastatin  20 mg Oral Daily    senna-docusate  1 tablet Oral BID    sodium chloride (PF)  3 mL Intracatheter Q8H       Data     Labs and Imaging results below reviewed today.    I have personally reviewed the following data over the past 24 hrs:    Recent Labs   Lab 07/31/23  0631 07/30/23  0701   WBC 16.8* 21.4*   HGB 9.6* 10.4*   HCT 30.3* 31.7*   MCV 96 92    207     Recent Labs   Lab 07/30/23  0701 07/29/23  0635    135*   POTASSIUM 5.1 4.3   CHLORIDE 105 98   CO2 24 25   ANIONGAP 8 12   * 136*  138*   BUN 16.1 21.0   CR 0.45* 0.62   GFRESTIMATED >90 >90   REBECA 8.5* 8.6*    PROTTOTAL 6.3*  --    ALBUMIN 3.5  --    BILITOTAL 0.3  --    ALKPHOS 51  --    ALT 8  --                  Recent Results (from the past 24 hour(s))   CT Abdomen Pelvis w/o Contrast    Narrative    EXAM: CT ABDOMEN PELVIS W/O CONTRAST  LOCATION: [facility]  DATE: 7/30/2023    INDICATION: bilateral lower quadrant pain abdomen  COMPARISON: CT abdomen pelvis 07/04/2009  TECHNIQUE: CT scan of the abdomen and pelvis was performed without IV contrast. Multiplanar reformats were obtained. Dose reduction techniques were used.  CONTRAST: None.    FINDINGS:   LOWER CHEST: Mild linear scarring or atelectasis in the lung bases.    HEPATOBILIARY: Smooth contour the surface of liver. No suspicious focal hepatic lesion, though limited in absence of intravenous contrast and artifact from spinal hardware. Gallbladder unremarkable without calcific cholelithiasis. No evidence for biliary   ductal dilatation.    PANCREAS: Moderate fatty atrophy. Otherwise unremarkable.    SPLEEN: Normal.    ADRENAL GLANDS: Normal.    KIDNEYS/BLADDER: No urolithiasis or hydroureteronephrosis. Minimally distended urinary bladder otherwise unremarkable.    BOWEL: Small and large bowel loops are normal in caliber without obstruction. Moderate retained pancolonic stool. Appendix is normal.    LYMPH NODES: No abdominal or pelvic lymphadenopathy.    VASCULATURE: Abdominal aorta normal in caliber with severe aortoiliac calcified atheromatous plaque. Right external iliac stents noted.    PELVIC ORGANS: Hysterectomy.    MUSCULOSKELETAL: Interval L2-L4 posterior spinal fixation with bilateral vertical rods and transpedicular screws and intervertebral spacers. There is solid fusion at L4-L5 and L5-S1. Circumscribed fluid collection with increase attenuation and no   internal gas or thick wall in the left part of midline paraspinal cysts subcutaneous tissues posterior to L3 level measuring 5.7 x 3.6 cm series 3-91. Subcentimeter  calcific/ossific density in the left paramidline cyst part spinal subcutaneous days   tissues at the level of L2 measuring 7 mm series 3-70. Few punctate foci of gas in the right paraspinal soft tissues and considered postoperative.      Impression    IMPRESSION:   1.  Interval L2-L4 spinal fixation. Subcutaneous left paraspinal hyperdense fluid collection is likely postoperative seroma or hematoma measuring up to 5.7 cm. No thick wall or internal gas to suggest abscess.  2.  No noncontrast evidence of acute abdominal or pelvic process to account for symptomatology.  3.  Moderate retained pancolonic stool.

## 2023-07-31 NOTE — PLAN OF CARE
Goal Outcome Evaluation:       Vital signs stable.  Dressing intact to lumbar spine.  Pain controlled with tylenol and oxycodone and iv dilaudid.  Ambulated with walker , gait belt and 1 assist.  Voiding.  HGB 10.4.  Plan to discharge to home.

## 2023-08-01 ENCOUNTER — APPOINTMENT (OUTPATIENT)
Dept: PHYSICAL THERAPY | Facility: CLINIC | Age: 78
DRG: 454 | End: 2023-08-01
Attending: ORTHOPAEDIC SURGERY
Payer: COMMERCIAL

## 2023-08-01 LAB
ANION GAP SERPL CALCULATED.3IONS-SCNC: 10 MMOL/L (ref 7–15)
BUN SERPL-MCNC: 10.7 MG/DL (ref 8–23)
CALCIUM SERPL-MCNC: 8.7 MG/DL (ref 8.8–10.2)
CHLORIDE SERPL-SCNC: 99 MMOL/L (ref 98–107)
CREAT SERPL-MCNC: 0.51 MG/DL (ref 0.51–0.95)
DEPRECATED HCO3 PLAS-SCNC: 27 MMOL/L (ref 22–29)
ERYTHROCYTE [DISTWIDTH] IN BLOOD BY AUTOMATED COUNT: 13.5 % (ref 10–15)
GFR SERPL CREATININE-BSD FRML MDRD: >90 ML/MIN/1.73M2
GLUCOSE SERPL-MCNC: 111 MG/DL (ref 70–99)
HCT VFR BLD AUTO: 33.6 % (ref 35–47)
HGB BLD-MCNC: 10.8 G/DL (ref 11.7–15.7)
MCH RBC QN AUTO: 30.1 PG (ref 26.5–33)
MCHC RBC AUTO-ENTMCNC: 32.1 G/DL (ref 31.5–36.5)
MCV RBC AUTO: 94 FL (ref 78–100)
PLATELET # BLD AUTO: 234 10E3/UL (ref 150–450)
POTASSIUM SERPL-SCNC: 3.8 MMOL/L (ref 3.4–5.3)
RBC # BLD AUTO: 3.59 10E6/UL (ref 3.8–5.2)
SODIUM SERPL-SCNC: 136 MMOL/L (ref 136–145)
WBC # BLD AUTO: 11.9 10E3/UL (ref 4–11)

## 2023-08-01 PROCEDURE — 82310 ASSAY OF CALCIUM: CPT | Performed by: INTERNAL MEDICINE

## 2023-08-01 PROCEDURE — 97116 GAIT TRAINING THERAPY: CPT | Mod: GP | Performed by: PHYSICAL THERAPIST

## 2023-08-01 PROCEDURE — 250N000013 HC RX MED GY IP 250 OP 250 PS 637: Performed by: NURSE PRACTITIONER

## 2023-08-01 PROCEDURE — 999N000157 HC STATISTIC RCP TIME EA 10 MIN

## 2023-08-01 PROCEDURE — 36415 COLL VENOUS BLD VENIPUNCTURE: CPT | Performed by: INTERNAL MEDICINE

## 2023-08-01 PROCEDURE — 999N000127 HC STATISTIC PERIPHERAL IV START W US GUIDANCE

## 2023-08-01 PROCEDURE — 97530 THERAPEUTIC ACTIVITIES: CPT | Mod: GP | Performed by: PHYSICAL THERAPIST

## 2023-08-01 PROCEDURE — 250N000013 HC RX MED GY IP 250 OP 250 PS 637: Performed by: INTERNAL MEDICINE

## 2023-08-01 PROCEDURE — 120N000001 HC R&B MED SURG/OB

## 2023-08-01 PROCEDURE — 85027 COMPLETE CBC AUTOMATED: CPT | Performed by: INTERNAL MEDICINE

## 2023-08-01 PROCEDURE — 250N000011 HC RX IP 250 OP 636: Mod: JZ | Performed by: ORTHOPAEDIC SURGERY

## 2023-08-01 PROCEDURE — 250N000013 HC RX MED GY IP 250 OP 250 PS 637: Performed by: ORTHOPAEDIC SURGERY

## 2023-08-01 PROCEDURE — 99232 SBSQ HOSP IP/OBS MODERATE 35: CPT | Performed by: INTERNAL MEDICINE

## 2023-08-01 PROCEDURE — 99223 1ST HOSP IP/OBS HIGH 75: CPT | Performed by: NURSE PRACTITIONER

## 2023-08-01 PROCEDURE — 94640 AIRWAY INHALATION TREATMENT: CPT

## 2023-08-01 RX ORDER — CLOPIDOGREL BISULFATE 75 MG/1
75 TABLET ORAL EVERY MORNING
Status: DISCONTINUED | OUTPATIENT
Start: 2023-08-01 | End: 2023-08-03 | Stop reason: HOSPADM

## 2023-08-01 RX ORDER — OXYCODONE HYDROCHLORIDE 5 MG/1
5 TABLET ORAL
Status: DISCONTINUED | OUTPATIENT
Start: 2023-08-01 | End: 2023-08-02

## 2023-08-01 RX ORDER — ACETAMINOPHEN 325 MG/1
650 TABLET ORAL EVERY 8 HOURS
Status: DISCONTINUED | OUTPATIENT
Start: 2023-08-01 | End: 2023-08-03 | Stop reason: HOSPADM

## 2023-08-01 RX ADMIN — CARBIDOPA AND LEVODOPA 3 TABLET: 25; 100 TABLET ORAL at 17:05

## 2023-08-01 RX ADMIN — ACETAMINOPHEN 650 MG: 325 TABLET, FILM COATED ORAL at 00:31

## 2023-08-01 RX ADMIN — CARBIDOPA AND LEVODOPA 1 TABLET: 50; 200 TABLET, EXTENDED RELEASE ORAL at 20:43

## 2023-08-01 RX ADMIN — HYDROMORPHONE HYDROCHLORIDE 0.5 MG: 1 INJECTION, SOLUTION INTRAMUSCULAR; INTRAVENOUS; SUBCUTANEOUS at 03:19

## 2023-08-01 RX ADMIN — CLOPIDOGREL BISULFATE 75 MG: 75 TABLET ORAL at 11:08

## 2023-08-01 RX ADMIN — CARBIDOPA AND LEVODOPA 1 TABLET: 50; 200 TABLET, EXTENDED RELEASE ORAL at 03:18

## 2023-08-01 RX ADMIN — OXYCODONE HYDROCHLORIDE 5 MG: 5 TABLET ORAL at 00:31

## 2023-08-01 RX ADMIN — ASPIRIN 81 MG: 81 TABLET, CHEWABLE ORAL at 08:44

## 2023-08-01 RX ADMIN — HYDROMORPHONE HYDROCHLORIDE 0.2 MG: 0.2 INJECTION, SOLUTION INTRAMUSCULAR; INTRAVENOUS; SUBCUTANEOUS at 08:46

## 2023-08-01 RX ADMIN — OXYCODONE HYDROCHLORIDE 5 MG: 5 TABLET ORAL at 20:43

## 2023-08-01 RX ADMIN — CARBIDOPA AND LEVODOPA 3 TABLET: 25; 100 TABLET ORAL at 08:44

## 2023-08-01 RX ADMIN — LOSARTAN POTASSIUM 50 MG: 50 TABLET, FILM COATED ORAL at 08:44

## 2023-08-01 RX ADMIN — ACETAMINOPHEN 650 MG: 325 TABLET, FILM COATED ORAL at 08:44

## 2023-08-01 RX ADMIN — ACETAMINOPHEN 650 MG: 325 TABLET, FILM COATED ORAL at 20:43

## 2023-08-01 RX ADMIN — DICLOFENAC SODIUM TOPICAL GEL, 1% 4 G: 10 GEL TOPICAL at 18:46

## 2023-08-01 RX ADMIN — OXYCODONE HYDROCHLORIDE 5 MG: 5 TABLET ORAL at 06:48

## 2023-08-01 RX ADMIN — ACETAMINOPHEN 650 MG: 325 TABLET, FILM COATED ORAL at 12:59

## 2023-08-01 RX ADMIN — ROSUVASTATIN CALCIUM 20 MG: 20 TABLET, FILM COATED ORAL at 08:44

## 2023-08-01 RX ADMIN — Medication 1 G: at 18:45

## 2023-08-01 RX ADMIN — SENNOSIDES AND DOCUSATE SODIUM 1 TABLET: 50; 8.6 TABLET ORAL at 20:43

## 2023-08-01 RX ADMIN — SENNOSIDES AND DOCUSATE SODIUM 1 TABLET: 50; 8.6 TABLET ORAL at 08:44

## 2023-08-01 RX ADMIN — CARBIDOPA AND LEVODOPA 3 TABLET: 25; 100 TABLET ORAL at 12:59

## 2023-08-01 RX ADMIN — OXYCODONE HYDROCHLORIDE 5 MG: 5 TABLET ORAL at 15:46

## 2023-08-01 RX ADMIN — Medication 1 G: at 20:45

## 2023-08-01 RX ADMIN — OXYCODONE HYDROCHLORIDE 5 MG: 5 TABLET ORAL at 11:08

## 2023-08-01 RX ADMIN — FLUTICASONE FUROATE AND VILANTEROL TRIFENATATE 1 PUFF: 100; 25 POWDER RESPIRATORY (INHALATION) at 08:08

## 2023-08-01 RX ADMIN — POLYETHYLENE GLYCOL 3350 17 G: 17 POWDER, FOR SOLUTION ORAL at 08:44

## 2023-08-01 RX ADMIN — DICLOFENAC SODIUM TOPICAL GEL, 1% 4 G: 10 GEL TOPICAL at 20:44

## 2023-08-01 ASSESSMENT — ACTIVITIES OF DAILY LIVING (ADL)
ADLS_ACUITY_SCORE: 32
ADLS_ACUITY_SCORE: 28
ADLS_ACUITY_SCORE: 28
ADLS_ACUITY_SCORE: 32
ADLS_ACUITY_SCORE: 32
ADLS_ACUITY_SCORE: 28
ADLS_ACUITY_SCORE: 32
ADLS_ACUITY_SCORE: 32
ADLS_ACUITY_SCORE: 28
ADLS_ACUITY_SCORE: 28
ADLS_ACUITY_SCORE: 32
ADLS_ACUITY_SCORE: 32

## 2023-08-01 NOTE — PROGRESS NOTES
Patient feels much better midday.  She had a large BM and feels it resolved her prior abdominal pain.  No further chest pain either.  Trop flat, not felt to have ACS.  Doubt atypical pain was cardiac, likely referred from significant constipation/abdominal discomfort.  She had been using dilaudid and oxycodone for all her different pains.  She declined TCU and reports she intends to go home but hasn't been very active past day.  I asked pain team to see her given ongoing pain issues.  Now with ABD pain improved, she reports her arthritis leg pain is getting worse as she has been of Enbrel a couple weeks.  Her original plan was to hold this for a week after.  I explained we ideally wait to improve healing but she feels she cannot discharge without having it with the arthritis pain.  She wants to take it today.  I explained she would need to have someone bring her home supply which she plans to do and we need to verify with her surgeon it is also ok first.  Full progress note to follow.

## 2023-08-01 NOTE — PROGRESS NOTES
[facility]    Medicine Progress Note - Hospitalist Service    Date of Admission:  7/28/2023    Assessment & Plan   Marleen Bobo is a 78 year old female admitted on 7/28/2023. She has a past medical history significant for coronary artery disease, stroke, brain aneurysm, hypertension, hyperlipidemia, peripheral arterial disease, COPD, and Parkinson's disease.  She underwent lumbar spine discectomy with interbody fusion surgery on 7/28/2023.  She became quite hypotensive while working with therapy/had dizziness earlier in stay.    Spinal stenosis s/p surgical intervention  -had lumbar fusion done 7/28  -Post-op site cares, activity restrictions, DVT proph per surgical team.  -Ji removed recently, voiding well now.       Acute on chronic pain  Arthritis:  -  Pain team consult.  Patient with multiple pain issues (back, ABD, chest pain 7/31, leg pain).    -  Patient requests her Enbrel.  I explained normally we would hold it at least a weak.  She feels her leg/ext pain more related to arthritis and not having it last week or this week.  She strongly requests it.  I had her RN check with the surgeon and he felt it was reasonable to resume as she was doing well from surgical perspective so it will be given later today.      Hypertension  Postoperative hypotension:  -Suspect hypotension related to opiates and losartan interacting with recent anesthetics.  -BP more stable back on losartan now.     Bilateral abd pain  Constipation:  -  ABD pain much better after large BM this afternoon.  Suspect related to constipation.  CP yesterday also may have been constipation related given more atypical presentation.     Parkinson's disease:  -Continue carbidopa/levodopa.     Coronary artery disease.  Previous stroke.  Peripheral arterial disease.  Hyperlipidemia:  -Continue aspirin 81 mg a day.  -Continue rosuvastatin 20 mg a day.  -Resume plavix 8/1, confirmed alright with surgical team.  -  No further CP,  "pain was atypical yesterday from prior pain.  ? If ABD related.  Trop were flat, EKG without major changes.  No further workup planned unless new CP.    COPD:  -No acute exacerbation.  -Continue inhaled fluticasone/vilanterol.  -Add albuterol if needed.     Hyponatremia:  -Mild, stable     Acute blood loss anemia.  -Postoperative losses as expected +/- dilutional  -home ASA/plavix resumed, monitor    Medical Decision Making       Over 50 MINUTES SPENT BY ME on the date of service doing chart review, history, exam, documentation & further activities per the note.      Labs/Imaging Reviewed:  See Information above and Data section below    PPE Used:  Mask       Diet: Advance Diet as Tolerated: Regular Diet Adult    DVT Prophylaxis: Pneumatic Compression Devices  Ji Catheter: Not present  Lines: None     Cardiac Monitoring: ACTIVE order. Indication: Chest pain/ ACS rule out (24 hours)  Code Status: Full Code      Clinically Significant Risk Factors                         # Overweight: Estimated body mass index is 29.2 kg/m  as calculated from the following:    Height as of this encounter: 1.664 m (5' 5.5\").    Weight as of this encounter: 80.8 kg (178 lb 3.2 oz).           Disposition Plan      Expected Discharge Date: 08/02/2023      Destination: home with help/services;home with family            Johnie De Santiago,   Hospitalist Service  [facility]  Securely message with [employer] (more info)  Text page via [employer] Paging/Directory   ______________________________________________________________________    Interval History   ABD Pain resolved after BM.  No further CP after last evening.  No SOB.  Some more leg/arthritis pain and she requests her Enbrel.    Physical Exam   Vital Signs: Temp: 97.6  F (36.4  C) Temp src: Temporal BP: (!) 140/62 Pulse: 69   Resp: 16 SpO2: 92 % O2 Device: None (Room air)    Weight: 178 lbs 3.2 oz    GEN:  Alert, oriented x 3, appears comfortable, no overt " distress.  HEENT:  Normocephalic/atraumatic, no scleral icterus, no nasal discharge, mouth moist.  CV:  Regular rate and rhythm, no loud murmur/rub.  LUNGS:  Clear to auscultation bilaterally without rales/rhonchi/wheezing/retractions.  Breath sounds mildly diminished bases.  Symmetric chest rise on inhalation noted.  ABD/BACK:  Active bowel sounds, soft, no abdominal pain, mildly distended.  No guarding/rigidity.  Surgical site exam deferred to surgical team.  EXT:  Trace LE edema.  No cyanosis.  No acute joint synovitis noted.  SKIN:  Dry to touch, no exanthems noted in the visualized areas.  NEURO:  Moving legs well on request, sensation to touch grossly intact though patient reports some subjective thigh numbness at times.    Medications      acetaminophen  650 mg Oral Q8H    aspirin  81 mg Oral Daily    carbidopa-levodopa  1 tablet Oral At Bedtime    carbidopa-levodopa  1 tablet Oral Q24H    carbidopa-levodopa  3 tablet Oral TID    clopidogrel  75 mg Oral QAM    diclofenac  4 g Topical 4x Daily    etanercept  50 mg Subcutaneous Weekly    fluticasone-vilanterol  1 puff Inhalation Daily    losartan  50 mg Oral Daily    menthol (Topical Analgesic) 2.5%  1 g Topical 4x Daily    polyethylene glycol  17 g Oral Daily    rosuvastatin  20 mg Oral Daily    senna-docusate  1 tablet Oral BID    sodium chloride (PF)  3 mL Intracatheter Q8H       Data     Labs and Imaging results below reviewed today.    I have personally reviewed the following data over the past 24 hrs:    Recent Labs     Recent Labs   Lab 08/01/23  0924 07/31/23  1649 07/31/23  0631   WBC 11.9* 13.8* 16.8*   HGB 10.8* 9.5* 9.6*   HCT 33.6* 29.8* 30.3*   MCV 94 94 96    201 195     Recent Labs   Lab 08/01/23  0924 07/31/23  1649 07/31/23  1638 07/30/23  0701    139  --  137   POTASSIUM 3.8 3.6  --  5.1   CHLORIDE 99 102  --  105   CO2 27 29  --  24   ANIONGAP 10 8  --  8   * 114* 109* 139*   BUN 10.7 15.6  --  16.1   CR 0.51 0.53  --   0.45*   GFRESTIMATED >90 >90  --  >90   REBECA 8.7* 8.4*  --  8.5*   PROTTOTAL  --   --   --  6.3*   ALBUMIN  --   --   --  3.5   BILITOTAL  --   --   --  0.3   ALKPHOS  --   --   --  51   ALT  --   --   --  8                     No results found for this or any previous visit (from the past 24 hour(s)).

## 2023-08-01 NOTE — DISCHARGE INSTRUCTIONS
Your home care referral was sent to [facility]  If you haven't heard from them within the next 24-48 hours,  Please call them at [phone]

## 2023-08-01 NOTE — CONSULTS
Care Management Follow Up    Length of Stay (days): 4    Expected Discharge Date: 08/02/2023     Concerns to be Addressed: care coordination/care conferences, discharge planning     Patient plan of care discussed at interdisciplinary rounds: Yes    Anticipated Discharge Disposition: Other (Comments) (TBD)     Anticipated Discharge Services: Other (see comment) (Possible home health care)  Anticipated Discharge DME: None    Patient/family educated on Medicare website which has current facility and service quality ratings:    Education Provided on the Discharge Plan: Yes  Patient/Family in Agreement with the Plan:      Referrals Placed by CM/SW:    Private pay costs discussed: Not applicable    Additional Information:    Met with pt at bedside to discuss the continued recommendation for TCU. Pt adamantly refused TCU and states that she has her son at home and friends who are helpful to her. Pt states that she has a foster son at home that her friends/family are watching and would like to get back home to him. Pt is open to this writer sending homecare referrals, pts insurance creates barriers for obtaining homecare. Pt is aware of this and agreeable to referral. Pt states that she has all of the DME and assist that she needs.     Homecare PT/OT/RN referral pending.     Addendum    Pt accepted to [facility] home health. AVS updated.     OLIVIER Adrian, MICHAEL  Inpatient Care Coordination  Emergency Room /Float  [phone]  Amee Castillo, NATALIA

## 2023-08-01 NOTE — CONSULTS
[facility] ACUTE PAIN SERVICE CONSULTATION   [facility]   Text Page Pain Via [facility]    Date of Admission:  7/28/2023  Date of Consult (When I saw the patient): 08/01/23  Physician requesting consult: Johnie De Santiago MD    Service: hospitalist   Reason for consult:  pain management     Assessment/Plan:     Marleen Bobo is a 78 year old female who was admitted on 7/28/2023.  4 Days Post-Op for L2-4 interbody posterior lateral fusion for spinal stenosis with neurogenic claudication. I was asked to see the patient for pain management with pain type musculoskeletal and chronic neuropathic pain with peripheral neuropathy. History of patient has pain history that was unresponsive to conservative therapy and elected above procedure.  She also has osteo and rheumatoid arthritis and presently these are her major complaints of pain in her hands knees hips and feet. Describes pain as sharp, tender, burning nonradiating and constant with numeric rating of range 4-9/10 and averaging 5/10. The patient denies shortness of breath, chest pain, pruritus, changes in mental status urination or defecation.  Earlier, patient had complaints of abdominal pain which was relieved with having large BM. The patient does not smoke, quit in 2011 and no chemical dependency history. Opioid tolerance is high.    Opioid Induced Respiratory Depression Risk Assessment:  (Low 0-1; Moderate 2-4; or High >4 or >/= 3 if two of the risk factors are age > 60 and opioid naive) due to the following risk factors: VINNIE, COPD/Asthma/pulmonary disease, CHF, renal dysfunction, hepatic dysfunction, Obesity, Smoker, Age>60, >2 opioid therapies, concomitant CNS depressants, opioid naive status, or post surgical ?   Chronic opioid use = 45 mg MME, opioid used this past 24 hours 2.5 mg hydromorphone IV, 20 mg oxycodone = 80 mg morphine equivalent.   PLAN:     1) Pain s consistent with musculoskeletal, secondary to acute  "postoperative state and chronic pain related to osteoarthritis and rheumatoid arthritis, in setting of multiple co-morbidities. Treatment plan includes multimodal pain approach, medications as listed below, reviewed.  Discharge medications, advised keeping track of doses, educated on tapering off as pain improves, watch for constipation and stool softeners, no driving or alcohol with opioids, store medications in a safe place, advised to take no more than the prescribed dose as increased doses may cause respiratory depression or death. Patient is understanding of the plan. All questions and concerns addressed to patient's satisfaction.   2)Multimodal Medication Therapy  Topical: Menthol gel (IcyHot) and Diclofenac gel Apply to to painful joints 4 times daily may apply at same time.   Adjuvants:CrCl is 99.6 mg/dl and Tylenol 650 mg every 8 hours\", Hydroxyzine not indicated\" Gabapentin not indicated, Muscle relaxer's not indicated.  Continue Enbrel first dose given tonight then weekly. \"  Antidepressants/anxiolytics: None  Opioids: Oxycodone 2.5 to 5 mg every 3 hours as needed  IV Pain medication: Dilaudid 0.2 mg every 2 hours as needed and Try to minimize & give po first not needed for breakthrough pain  3)Non-medication interventions- Ice, Heat, physical therapy, distraction aroma therapy   4)Constipation Prophylaxis- senna and miralax . Continue to monitor.   5) Follow up   -acute pain team will sign off.   -Opioid prescriber has been Madeleine Coe CNP [place] [facility]. PCP is [facility].    -Discharge Recommendations - We recommend prescribing the following at the time of discharge:   Oxycodone 5 mg #20 tabs every 4 hours as needed  Continue scheduled Tylenol 650 mg every 8 hours  Topicals diclofenac gel and menthol gel applied 4 times daily as needed to painful joints.  Narcan nasal spray as rescue for opioid induced respiratory suppression as needed ordered by this " provider.    Disposition: Home    Prescribing at discharge: Surgical team       History of Present Illness (HPI):       Marleen Bobo is a 78 year old old female who presents following lumbar interbody fusion postop day 6 after failing conservative treatments to manage pain.  The patient reports pain that is well controlled and low back is nonradiating.  She is complaining of pain in her joints mainly hands knees hips and feet related to osteo and rheumatoid arthritis.  She was pleased she was able to receive her dose of Enbrel today.  Current pain is rated at 5/10 and goal is 4/10. The patient  has a past complex medical history noted below. The patient does have a history of chronic pain and follows with [place] pain [facility] provider. Per [place]  review noting Percocet 7.5 APAP 325 mg chronic use at 4/day. The patient has a moderate opioid tolerance. Opioid induced side effects including sedation, respiratory suppression, nausea, and constipation. 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     Discussed multimodal interventions, interventions other than systemic pharmacologic treatments.    Review of medical record/Summary of labs and care everywhere  Past pain treatments have included pain clinic, opiates, Enbrel, CBD oil, lidocaine patches    Last UDS 3/24/2023 results as expected       [place] -pulled from system on 08/01/23. Last refill on 7/19/2023 oxycodone/APAP 7.5 mg #120 Madeleine Coe CNP. Does  supports analgesic of opioid use.       Medical History   has a past medical history of Aneurysm of other specified arteries (H) (1999), Arthritis, Cervical cancer (H) (1970), COPD (chronic obstructive pulmonary disease) (H), Double vision, Gastroesophageal reflux disease, Glaucoma, Headache(784.0), Heart attack (H), Hypertension, Other chronic pain, Parkinsons disease (H), Sleep apnea, Sneezing, Stented coronary artery, Stroke (H), and Subarachnoid hemorrhage (H)  (1994).    She has no past medical history of Chronic infection, Diabetes (H), History of blood transfusion, Malignant hyperthermia, PONV (postoperative nausea and vomiting), or Thyroid disease.       Surgical History   has a past surgical history that includes cataract iol, rt/lt (Right, 01/14/2015); cataract iol, rt/lt (Left, 2015); back surgery; aneurysm clipping; Hysterectomy; craniotomy; appendectomy (1970); and Fusion spine posterior minimally invasive two levels (N/A, 7/28/2023).     Allergies     Allergies   Allergen Reactions    Levofloxacin Anaphylaxis and Other (See Comments)    Tegaderm Transparent Dressing (Informational Only) Rash    Clonidine Unknown and Other (See Comments)     See Care Everywhere      Methotrexate Other (See Comments)    Prednisone Unknown and Other (See Comments)    Sulfasalazine Nausea and Vomiting    Adhesive Tape Rash    Dilantin [Phenytoin] Anxiety    Latex Itching and Rash    Liquid Adhesive Rash     tegaderm    Silver Nitrate Rash        Current Home Medications   Prior to Admission medications    Medication Sig Start Date End Date Taking? Authorizing Provider   amoxicillin (AMOXIL) 875 MG tablet TAKE ONE TABLET BY MOUTH TWICE DAILY UNTIL GONE .FINISH COURSE   Yes Reported, Patient   aspirin (ASA) 81 MG chewable tablet Take 81 mg by mouth daily   Yes Unknown, Entered By History   budesonide-formoterol (SYMBICORT) 80-4.5 MCG/ACT Inhaler Inhale 2 puffs into the lungs daily as needed 6/29/23  Yes Reported, Patient   carbidopa-levodopa (SINEMET CR)  MG CR tablet Take 1 tablet by mouth At Bedtime And 1 tablet during the night 1442-0636 upon waking with tremors 12/29/16  Yes Reported, Patient   carbidopa-levodopa (SINEMET)  MG per tablet Take 3 tablets by mouth 3 times daily At 0900, 1300 and 1700   Yes Reported, Patient   clopidogrel (PLAVIX) 75 MG tablet Take 1 tablet by mouth every morning 6/6/23  Yes Reported, Patient   etanercept (ENBREL) 50 MG/ML injection Inject  50 mg Subcutaneous once a week   Yes Reported, Patient   loratadine (CLARITIN) 10 MG tablet Take 10 mg by mouth daily as needed 4/13/22  Yes Reported, Patient   losartan (COZAAR) 50 MG tablet Take 1 tablet by mouth daily 7/10/23  Yes Reported, Patient   nitroGLYcerin (NITROSTAT) 0.4 MG sublingual tablet Place 0.4 mg under the tongue as needed 6/13/22  Yes Reported, Patient   oxyCODONE-acetaminophen (PERCOCET) 7.5-325 MG per tablet Take 1 tablet by mouth every 4 hours as needed for pain Max 4 tabs/day   Yes Reported, Patient   rosuvastatin (CRESTOR) 20 MG tablet Take 20 mg by mouth daily 4/12/22  Yes Reported, Patient   albuterol (PROVENTIL) (2.5 MG/3ML) 0.083% neb solution Take 3 mLs by nebulization every 4 hours as needed   4/13/22  Reported, Patient   fluticasone-salmeterol (ADVAIR DISKUS) 500-50 MCG/DOSE inhaler Inhale 1 puff into the lungs 2 times daily 12/21/17 4/13/22  Reported, Patient          Social History  Reviewed, and she  reports that she quit smoking about 11 years ago. Her smoking use included cigarettes. She has never used smokeless tobacco. She reports current alcohol use. She reports that she does not use drugs.      Family History- Reviewed care everywhere to find family history Nothing relevant to pain consult    Reviewed, and family history includes Arthritis in her mother; Cardiovascular in her father; Cerebrovascular Disease in her father; Diabetes in her maternal grandmother; Glaucoma in her mother.    Review of Systems  Complete ROS reviewed, unless noted  , all other systems reviewed (with patient) and all others found to be negative.         Objective:     Vitals:  B/P: 140/62, T: 97.6, P: 69, R: 16     Weight:   178 lbs 3.2 oz  Body mass index is 29.2 kg/m .      Physical Exam:     General Appearance:  Alert, cooperative, no distress, appears stated age.  Is pleasant  Patient is able to make needs known   Head:  Normocephalic, without obvious abnormality, atraumatic   Eyes:  Pupils are  EOMIs, midposition pupils   ENT/Throat: Lips and mouth are moist   Lymph/Neck: Supple, symmetrical, trachea midline, no adenopathy, thyroid: not enlarged, symmetric    Lungs:   Clear to auscultation bilaterally, respirations unlabored   Chest Wall:  No tenderness or deformity   Cardiovascular/Heart:  Regular rate and rhythm, S1, S2, hypertensive   Abdomen:   Soft, non-tender, bowel sounds active all four quadrants,  no masses, no organomegaly   Musculoskeletal: Extremities normal, atraumatic  Incision covered with ABD without drainage   Skin: Skin color good, lesions none   Neurologic: Alert and oriented X 3, Moves all 4 extremities, symmetric power x4          Imaging Reviewed Personally By Myself     Results for orders placed or performed during the hospital encounter of 07/28/23   CT Abdomen Pelvis w/o Contrast    Impression    IMPRESSION:   1.  Interval L2-L4 spinal fixation. Subcutaneous left paraspinal hyperdense fluid collection is likely postoperative seroma or hematoma measuring up to 5.7 cm. No thick wall or internal gas to suggest abscess.  2.  No noncontrast evidence of acute abdominal or pelvic process to account for symptomatology.  3.  Moderate retained pancolonic stool.     XR Chest Port 1 View    Impression    IMPRESSION: Borderline heart size with normal pulmonary vascularity. Mildly calcified thoracic aorta. Old left rib fractures. Minimal linear atelectasis left lung base. The chest is otherwise normal. The chest is rotated.          Labs Reviewed Personally By Myself    Sodium   Date Value Ref Range Status   08/01/2023 136 136 - 145 mmol/L Final   07/04/2009 133 133 - 144 mmol/L Final     Potassium   Date Value Ref Range Status   08/01/2023 3.8 3.4 - 5.3 mmol/L Final   07/04/2009 4.1 3.4 - 5.3 mmol/L Final     Chloride   Date Value Ref Range Status   08/01/2023 99 98 - 107 mmol/L Final   07/04/2009 101 94 - 109 mmol/L Final     Carbon Dioxide   Date Value Ref Range Status   07/04/2009 27 20 - 32  mmol/L Final     Carbon Dioxide (CO2)   Date Value Ref Range Status   08/01/2023 27 22 - 29 mmol/L Final     Anion Gap   Date Value Ref Range Status   08/01/2023 10 7 - 15 mmol/L Final   07/04/2009 4.6 (L) 6 - 17 mmol/L Final     Glucose   Date Value Ref Range Status   08/01/2023 111 (H) 70 - 99 mg/dL Final   07/04/2009 99 60 - 99 mg/dL Final     GLUCOSE BY METER POCT   Date Value Ref Range Status   07/31/2023 109 (H) 70 - 99 mg/dL Final     Urea Nitrogen   Date Value Ref Range Status   08/01/2023 10.7 8.0 - 23.0 mg/dL Final   07/04/2009 19 7 - 30 mg/dL Final     Creatinine   Date Value Ref Range Status   08/01/2023 0.51 0.51 - 0.95 mg/dL Final   07/04/2009 0.80 0.52 - 1.04 mg/dL Final     Comment:     New IDMS-traceable calibration  beginning 5/1/08     GFR Estimate   Date Value Ref Range Status   08/01/2023 >90 >60 mL/min/1.73m2 Final   07/04/2009 73 >60 mL/min/1.7m2 Final     Calcium   Date Value Ref Range Status   08/01/2023 8.7 (L) 8.8 - 10.2 mg/dL Final   07/04/2009 8.7 8.5 - 10.4 mg/dL Final            Please see A&P for additional details of medical decision making.  MANAGEMENT DISCUSSED with the following over the past 24 hours: Daniela Jacobs RN   NOTE(S)/MEDICAL RECORDS REVIEWED over the past 24 hours: Yes  Tests REVIEWED in the past 24 hours:  - See lab/imaging results included in the data section of the note  - BMP  - CBC  Medical complexity over the past 24 hours:  - Decision to DE-ESCALATE CARE based on prognosis  - Prescription DRUG MANAGEMENT performed  50 minutes total time spent in chart review, face-to-face and communication with the interdisciplinary team.  Thank you for this consultation.      Bailey Nunn RN, PGMT-BC APRN,CNP,ACHPN   Acute Pain Team ( SD/RH) 8-4:30 after 3:30 page house officer   No weekend coverage   [facility] Paging/Directory Pain  Securely message with the Vocera Web Console (learn more here)

## 2023-08-01 NOTE — PLAN OF CARE
"Goal Outcome Evaluation: no change. Unable to meet goals of mobility and pain control. \"I have bad pain. I need my Enbrel shot. I take the shot every week.\" Unable to walk this morning. Stood at the bedside and declined to walk and declined to get on bedside commode. Had to use the purewick and she is very vocal that I need that enbrel shot. Dr. De Santiago will be rounding today. I touched back with him via vocera and pain team ordered and will be addressed.       Plan of Care Reviewed With: patient    Overall Patient Progress: no change    Patient vital signs are at baseline: Yes. No chest pain. Room air.   Patient able to ambulate as they were prior to admission or with assist devices provided by therapies during their stay:  No,  Reason:  declined to ambulate or sit in chair due to leg pain. Legs are weak. Mobility poor today. SW consult placed for plan of care.   Patient MUST void prior to discharge:  Yes  Patient able to tolerate oral intake:  Yes  Pain has adequate pain control using Oral analgesics:  No,  Reason:  requiring IV dilaudid for \"arthritis pain to my legs.\" Pain in back appears to be controlled. Will cry in pain, no tears.   Does patient have an identified :  Yes. Lives with son  Has goal D/C date and time been discussed with patient:  Yes. Discussed this morning home versus safe discharge plan to maybe include a TCU. Patient voiced that \"my son is strong and can help me. I just need that shot.\" Unable to take a step to ambulate at this time.   Bruising to back around incision. Able to move legs per self. Some tremors to left side.     1500 resting between cares. Pain to legs. Enbrel given now- ok per Dr. Nation. Patient voicing that she wants to go home tomorrow. Did not eat well today. Encouraged food and fluids. Had a BM large and hard stool today at 1100 and that decreased abd pain. Sleeping often with eyes closed or rating pain 10/10. 1 assist to bedside commode and declined walking into the " bathroom. Refused purewick around 1100.

## 2023-08-01 NOTE — PLAN OF CARE
Goal Outcome Evaluation:       Pt A&O x4, afebrile. BP elevated. Rating pain 10/10, oxy 5 mg given. Assist of 2 to the bedside commode, no BM this shift. Milk of magnesia given. Voiding, external catheter in place. Remote tele. Will continue to monitor.

## 2023-08-01 NOTE — PLAN OF CARE
Pt is alert and oriented x4, assist of 2 with transfers to bedside commode. Pt reported pain 9,10/10, prn Oxycodone x2, Tylenol x1, IV Dilaudid x1 given, pt still rating her pain high, wants to be be repositioned often, ice applied. Dressing to lower back cdi. Pt is voiding adequate amounts via pure wick. Plan to discharge home with services vs TCU.

## 2023-08-01 NOTE — PROGRESS NOTES
DAILY PROGRESS NOTE    Marleen Bobo is a 78 year old old female admitted on 7/28/2023 10:47 AM.    Subjective  Marleen is a 79 y/o female presenting POD 4. Pt states that she is doing very well today and states that most of the pain she is having today is in her thighs bilaterally, but notes it is a direct result of being off of her rheumatoid arthritis medication. Pt states that they continued her Embrel today and believes this will help with the thigh pain she is currently having. Pt notes she had a large bowel movement today and this has relieved her previous abdominal pain. Pt notes ambulating well and has no concerns at this time.      Objective    AAOx3, CORTES , f/c 4/4   Ambulating,  Sensation intact bilaterally     Hemoglobin   Date Value Ref Range Status   08/01/2023 10.8 (L) 11.7 - 15.7 g/dL Final   07/04/2009 12.0 11.7 - 15.7 g/dL Final   ]      Impression / Plan  Patient is doing very well and is ready for discharge pending hospital clearance.    Plan for today:    Patient doing well  Ambulate with help  PT OT, possibly clearance   Full diet  Today  Wean and d/c PCA and transition to PO meds today

## 2023-08-02 ENCOUNTER — APPOINTMENT (OUTPATIENT)
Dept: PHYSICAL THERAPY | Facility: CLINIC | Age: 78
DRG: 454 | End: 2023-08-02
Attending: ORTHOPAEDIC SURGERY
Payer: COMMERCIAL

## 2023-08-02 ENCOUNTER — APPOINTMENT (OUTPATIENT)
Dept: OCCUPATIONAL THERAPY | Facility: CLINIC | Age: 78
DRG: 454 | End: 2023-08-02
Attending: ORTHOPAEDIC SURGERY
Payer: COMMERCIAL

## 2023-08-02 LAB
ANION GAP SERPL CALCULATED.3IONS-SCNC: 9 MMOL/L (ref 7–15)
BUN SERPL-MCNC: 14.6 MG/DL (ref 8–23)
CALCIUM SERPL-MCNC: 8.9 MG/DL (ref 8.8–10.2)
CHLORIDE SERPL-SCNC: 100 MMOL/L (ref 98–107)
CREAT SERPL-MCNC: 0.55 MG/DL (ref 0.51–0.95)
DEPRECATED HCO3 PLAS-SCNC: 28 MMOL/L (ref 22–29)
GFR SERPL CREATININE-BSD FRML MDRD: >90 ML/MIN/1.73M2
GLUCOSE SERPL-MCNC: 139 MG/DL (ref 70–99)
HGB BLD-MCNC: 10.9 G/DL (ref 11.7–15.7)
POTASSIUM SERPL-SCNC: 4 MMOL/L (ref 3.4–5.3)
SODIUM SERPL-SCNC: 137 MMOL/L (ref 136–145)

## 2023-08-02 PROCEDURE — 120N000001 HC R&B MED SURG/OB

## 2023-08-02 PROCEDURE — 250N000013 HC RX MED GY IP 250 OP 250 PS 637: Performed by: HOSPITALIST

## 2023-08-02 PROCEDURE — 97530 THERAPEUTIC ACTIVITIES: CPT | Mod: GP

## 2023-08-02 PROCEDURE — 80048 BASIC METABOLIC PNL TOTAL CA: CPT | Performed by: INTERNAL MEDICINE

## 2023-08-02 PROCEDURE — 36415 COLL VENOUS BLD VENIPUNCTURE: CPT | Performed by: INTERNAL MEDICINE

## 2023-08-02 PROCEDURE — 85018 HEMOGLOBIN: CPT | Performed by: INTERNAL MEDICINE

## 2023-08-02 PROCEDURE — 250N000013 HC RX MED GY IP 250 OP 250 PS 637: Performed by: ORTHOPAEDIC SURGERY

## 2023-08-02 PROCEDURE — 99231 SBSQ HOSP IP/OBS SF/LOW 25: CPT | Performed by: NURSE PRACTITIONER

## 2023-08-02 PROCEDURE — 250N000013 HC RX MED GY IP 250 OP 250 PS 637: Performed by: INTERNAL MEDICINE

## 2023-08-02 PROCEDURE — 250N000013 HC RX MED GY IP 250 OP 250 PS 637: Performed by: NURSE PRACTITIONER

## 2023-08-02 PROCEDURE — 97535 SELF CARE MNGMENT TRAINING: CPT | Mod: GO | Performed by: REHABILITATION PRACTITIONER

## 2023-08-02 RX ORDER — OXYCODONE HYDROCHLORIDE 5 MG/1
5 TABLET ORAL
Status: DISCONTINUED | OUTPATIENT
Start: 2023-08-02 | End: 2023-08-03 | Stop reason: HOSPADM

## 2023-08-02 RX ORDER — GABAPENTIN 100 MG/1
100 CAPSULE ORAL 3 TIMES DAILY
Status: DISCONTINUED | OUTPATIENT
Start: 2023-08-02 | End: 2023-08-03 | Stop reason: HOSPADM

## 2023-08-02 RX ADMIN — ACETAMINOPHEN 650 MG: 325 TABLET, FILM COATED ORAL at 20:19

## 2023-08-02 RX ADMIN — CALCIUM CARBONATE (ANTACID) CHEW TAB 500 MG 500 MG: 500 CHEW TAB at 21:50

## 2023-08-02 RX ADMIN — DICLOFENAC SODIUM TOPICAL GEL, 1% 4 G: 10 GEL TOPICAL at 08:13

## 2023-08-02 RX ADMIN — OXYCODONE HYDROCHLORIDE 5 MG: 5 TABLET ORAL at 08:12

## 2023-08-02 RX ADMIN — OXYCODONE HYDROCHLORIDE 7.5 MG: 5 TABLET ORAL at 11:45

## 2023-08-02 RX ADMIN — FLUTICASONE FUROATE AND VILANTEROL TRIFENATATE 1 PUFF: 100; 25 POWDER RESPIRATORY (INHALATION) at 10:31

## 2023-08-02 RX ADMIN — Medication 1 G: at 21:50

## 2023-08-02 RX ADMIN — DICLOFENAC SODIUM TOPICAL GEL, 1% 4 G: 10 GEL TOPICAL at 11:47

## 2023-08-02 RX ADMIN — Medication 1 G: at 08:13

## 2023-08-02 RX ADMIN — OXYCODONE HYDROCHLORIDE 5 MG: 5 TABLET ORAL at 03:50

## 2023-08-02 RX ADMIN — ACETAMINOPHEN 650 MG: 325 TABLET, FILM COATED ORAL at 05:24

## 2023-08-02 RX ADMIN — CARBIDOPA AND LEVODOPA 3 TABLET: 25; 100 TABLET ORAL at 08:12

## 2023-08-02 RX ADMIN — SENNOSIDES AND DOCUSATE SODIUM 1 TABLET: 50; 8.6 TABLET ORAL at 20:19

## 2023-08-02 RX ADMIN — OXYCODONE HYDROCHLORIDE 5 MG: 5 TABLET ORAL at 00:42

## 2023-08-02 RX ADMIN — ACETAMINOPHEN 650 MG: 325 TABLET, FILM COATED ORAL at 14:03

## 2023-08-02 RX ADMIN — LOSARTAN POTASSIUM 50 MG: 50 TABLET, FILM COATED ORAL at 08:12

## 2023-08-02 RX ADMIN — DICLOFENAC SODIUM TOPICAL GEL, 1% 4 G: 10 GEL TOPICAL at 21:50

## 2023-08-02 RX ADMIN — OXYCODONE HYDROCHLORIDE 7.5 MG: 5 TABLET ORAL at 20:19

## 2023-08-02 RX ADMIN — DICLOFENAC SODIUM TOPICAL GEL, 1% 4 G: 10 GEL TOPICAL at 15:39

## 2023-08-02 RX ADMIN — CARBIDOPA AND LEVODOPA 3 TABLET: 25; 100 TABLET ORAL at 14:03

## 2023-08-02 RX ADMIN — GABAPENTIN 100 MG: 100 CAPSULE ORAL at 20:20

## 2023-08-02 RX ADMIN — CARBIDOPA AND LEVODOPA 1 TABLET: 50; 200 TABLET, EXTENDED RELEASE ORAL at 00:58

## 2023-08-02 RX ADMIN — ASPIRIN 81 MG: 81 TABLET, CHEWABLE ORAL at 08:12

## 2023-08-02 RX ADMIN — SENNOSIDES AND DOCUSATE SODIUM 1 TABLET: 50; 8.6 TABLET ORAL at 10:32

## 2023-08-02 RX ADMIN — Medication 1 G: at 11:47

## 2023-08-02 RX ADMIN — OXYCODONE HYDROCHLORIDE 7.5 MG: 5 TABLET ORAL at 15:38

## 2023-08-02 RX ADMIN — Medication 1 G: at 15:39

## 2023-08-02 RX ADMIN — GABAPENTIN 100 MG: 100 CAPSULE ORAL at 14:03

## 2023-08-02 RX ADMIN — CARBIDOPA AND LEVODOPA 3 TABLET: 25; 100 TABLET ORAL at 17:00

## 2023-08-02 RX ADMIN — CLOPIDOGREL BISULFATE 75 MG: 75 TABLET ORAL at 08:12

## 2023-08-02 RX ADMIN — CARBIDOPA AND LEVODOPA 1 TABLET: 50; 200 TABLET, EXTENDED RELEASE ORAL at 21:50

## 2023-08-02 RX ADMIN — ROSUVASTATIN CALCIUM 20 MG: 20 TABLET, FILM COATED ORAL at 08:12

## 2023-08-02 ASSESSMENT — ACTIVITIES OF DAILY LIVING (ADL)
ADLS_ACUITY_SCORE: 27
ADLS_ACUITY_SCORE: 29
ADLS_ACUITY_SCORE: 27
ADLS_ACUITY_SCORE: 32
ADLS_ACUITY_SCORE: 32
ADLS_ACUITY_SCORE: 27
ADLS_ACUITY_SCORE: 27
ADLS_ACUITY_SCORE: 32
ADLS_ACUITY_SCORE: 27
ADLS_ACUITY_SCORE: 29

## 2023-08-02 NOTE — PROVIDER NOTIFICATION
Left voicemail for Era MCGOVERN about patients pain control and not being able to work with therapy this morning. Pt will be staying another night per medical team.

## 2023-08-02 NOTE — PLAN OF CARE
Goal Outcome Evaluation:      Plan of Care Reviewed With: patient    Overall Patient Progress: improvingOverall Patient Progress: improving    Patient A&O x 4. Increased pain at the beginning of shift. Seen by pain management and put on new plan of care. New plan going well per patient. SBA with walker while ambulating. VSS. Patient states she would like to time Gabapentin and Oxycodone doses to be taken at the same time. During PT patient had episodes of orthostatic BP drop with standing. Dr sotelo, BP medication addressed. Possible discontinue tomorrow to home with home care if pain under control.

## 2023-08-02 NOTE — PLAN OF CARE
Goal Outcome Evaluation:      Plan of Care Reviewed With: patient    Overall Patient Progress: no changeOverall Patient Progress: no change    Outcome Evaluation: patient tolerating oral pain medications    Patient  is alert and oriented to self, place, and situation.  She is tolerating oral fluids and pain medications well.  She assist of one with transfer gait belt/ walker to bed side commode. Pain 8-10/10, oxycodone, Tylenol, and repositioning offered every two hours. Vitals are stable, bed in lowest position, with call light in reach. Patient also has bruising on her lower back. Drsg Cdi. Cms intact except for baseline neuropathy. Patient is refusing TCU as son can help her at home. Patient hope to discharge today, with home care services.

## 2023-08-02 NOTE — PROGRESS NOTES
Care Management Follow Up    Length of Stay (days): 5    Expected Discharge Date: 08/02/2023     Concerns to be Addressed: care coordination/care conferences, discharge planning     Patient plan of care discussed at interdisciplinary rounds: Yes    Anticipated Discharge Disposition: Other (Comments) (TBD)     Anticipated Discharge Services: Other (see comment) (Possible home health care)  Anticipated Discharge DME: None    Patient/family educated on Medicare website which has current facility and service quality ratings:    Education Provided on the Discharge Plan: Yes  Patient/Family in Agreement with the Plan:      Referrals Placed by CM/SW:    Private pay costs discussed: Not applicable    Additional Information:  Patient continues to decline TCU. Pain Team to work with patient again today. PCP referral to the [facility] team for pain management follow up next week.    Jadyn Craig, RN, BSN, CM  Inpatient Care Coordination  [facility]  [phone]

## 2023-08-02 NOTE — PROGRESS NOTES
"SPIRITUAL HEALTH SERVICES Progress Note  Ortho Spine    Saw pt Marleen Bobo per length of stay.    Patient/Family Understanding of Illness and Goals of Care - Marleen shared that she had a fusion surgery and is in a lot of pain. She stated, \"I didn't want to be in pain or take pills, and here I am full of pain and full of pills.\" She hopes to go home today but is not sure if that will happen.    Distress and Loss - Marleen voiced distress around the amount of pain that she is in.    Strengths, Coping, and Resources -   Marleen has a foster child, Jaquan, who is an important person in her life.   Her son, Holger, has been up to visit her in the hospital.    Meaning, Beliefs, and Spirituality - Marleen shared that she is [employer] and looks to her aga as a resource. We prayed the Lord's Prayer together at her request.    Plan of Care - No further needs at this time. [facility] remains available upon request.    ANSON Allen.   Intern    [facility] routine referrals *[phone]   [facility] available 24/7 for emergent requests/referrals, either by paging the on-call  or by entering an ASAP/STAT consult in Epic (this will also page the on-call ).   "

## 2023-08-02 NOTE — PROGRESS NOTES
[facility] ACUTE PAIN SERVICE    [facility]   Daily PAIN Progress Note        AMCOM Paging/Directory Pain  Securely message with the [employer] Web Console (learn more here)  (When I saw the patient): 08/02/23  Date of Admission:  7/28/2023  Date of Consult (When I saw the patient): 08/01/23  Physician requesting consult: Johnie De Santiago MD    Service: hospitalist   Reason for consult:  pain management    Assessment/Plan:  Marleen Bobo is a 78 year old female who was admitted on 7/28/2023.  4 Days Post-Op for L2-4 interbody posterior lateral fusion for spinal stenosis with neurogenic claudication. I was asked to see the patient for pain management with pain type musculoskeletal and chronic neuropathic pain with peripheral neuropathy. History of patient has pain history that was unresponsive to conservative therapy and elected above procedure.  She also has osteo and rheumatoid arthritis and presently these are her major complaints of pain in her hands knees hips and feet. Describes pain as sharp, tender, burning nonradiating and constant with numeric rating of range 4-9/10 and averaging 5/10. The patient denies shortness of breath, chest pain, pruritus, changes in mental status urination or defecation.  Earlier, patient had complaints of abdominal pain which was relieved with having large BM. The patient does not smoke, quit in 2011 and no chemical dependency history. Opioid tolerance is high.     Opioid Induced Respiratory Depression Risk Assessment:  (Low 0-1; Moderate 2-4; or High >4 or >/= 3 if two of the risk factors are age > 60 and opioid naive) due to the following risk factors: VINNIE, COPD/Asthma/pulmonary disease, CHF, renal dysfunction, hepatic dysfunction, Obesity, Smoker, Age>60, >2 opioid therapies, concomitant CNS depressants, opioid naive status, or post surgical ?   Chronic opioid use = 45 mg MME, opioid used this past 24 hours 0.2 mg hydromorphone IV, 25 mg oxycodone = 41.5 mg morphine  "equivalent.   PLAN:      1) Pain s consistent with musculoskeletal, secondary to acute postoperative state and chronic pain related to osteoarthritis and rheumatoid arthritis, in setting of multiple co-morbidities. Treatment plan includes multimodal pain approach, medications as listed below, reviewed.  Discharge medications, advised keeping track of doses, educated on tapering off as pain improves, watch for constipation and stool softeners, no driving or alcohol with opioids, store medications in a safe place, advised to take no more than the prescribed dose as increased doses may cause respiratory depression or death. Patient is understanding of the plan. All questions and concerns addressed to patient's satisfaction.   2)Multimodal Medication Therapy  Topical: Menthol gel (IcyHot) and Diclofenac gel Apply to to painful joints 4 times daily may apply at same time.   Adjuvants:CrCl is 92.3 mg/dl and Tylenol 650 mg every 8 hours\", Hydroxyzine not indicated\" Gabapentin 100 mg 3 times daily, Muscle relaxer's not indicated.  Continue Enbrel first dose given tonight then weekly. \"  Antidepressants/anxiolytics: None  Opioids: Oxycodone 5 to 7.5 mg every 3 hours as needed  IV Pain medication: Dilaudid 0.2 mg every 2 hours as needed and Try to minimize & give po first not needed for breakthrough pain  3)Non-medication interventions- Ice, Heat, physical therapy, distraction aroma therapy   4)Constipation Prophylaxis- senna and miralax . Continue to monitor.   5) Follow up   -acute pain team will continue to follow.   -Opioid prescriber has been Madeleine Coe CNP [place] [facility]. PCP is [facility].     -Discharge Recommendations - We recommend prescribing the following at the time of discharge:   Oxycodone 5 mg #20 tabs every 4 hours as needed  Continue scheduled Tylenol 650 mg every 8 hours  Topicals diclofenac gel and menthol gel applied 4 times daily as needed to painful joints.  Narcan nasal " "spray as rescue for opioid induced respiratory suppression as needed ordered by this provider.     Disposition: Home     Prescribing at discharge: Surgical team              Subjective:  Reports having increased pain low back, hips, buttocks and right leg to knee.  Pain more manageable.  Desires to go home but is unable to mobilize adequately, some dizziness and orthostasis this morning.  \"I want to go home, I had the surgery so I would not have back pain and I could get off of that medication \"(opiates).  Concerned she has a foster son at home and if does not get home she may lose custody.           Lumbar stenosis   Patient Active Problem List   Diagnosis    VINNIE (obstructive sleep apnea)    Lumbar pain    Lumbar stenosis        History   Drug Use Unknown         Tobacco Use      Smoking status: Former        Types: Cigarettes        Quit date: 10/25/2011        Years since quittin.7      Smokeless tobacco: Never         acetaminophen  650 mg Oral Q8H    aspirin  81 mg Oral Daily    carbidopa-levodopa  1 tablet Oral At Bedtime    carbidopa-levodopa  1 tablet Oral Q24H    carbidopa-levodopa  3 tablet Oral TID    clopidogrel  75 mg Oral QAM    diclofenac  4 g Topical 4x Daily    etanercept  50 mg Subcutaneous Weekly    fluticasone-vilanterol  1 puff Inhalation Daily    gabapentin  100 mg Oral TID    [Held by provider] losartan  50 mg Oral Daily    menthol (Topical Analgesic) 2.5%  1 g Topical 4x Daily    polyethylene glycol  17 g Oral Daily    rosuvastatin  20 mg Oral Daily    senna-docusate  1 tablet Oral BID    sodium chloride (PF)  3 mL Intracatheter Q8H       Objective:  Vital signs in last 24 hours:  B/P: 143/56, T: 97.7, P: 74, R: 18   Blood pressure (!) 143/56, pulse 74, temperature 97.7  F (36.5  C), temperature source Temporal, resp. rate 18, height 1.664 m (5' 5.5\"), weight 80.8 kg (178 lb 3.2 oz), SpO2 92 %.      Weight:   Wt Readings from Last 2 Encounters:   23 80.8 kg (178 lb 3.2 oz) "           Intake/Output:    Intake/Output Summary (Last 24 hours) at 8/2/2023 1448  Last data filed at 8/1/2023 1700  Gross per 24 hour   Intake 360 ml   Output --   Net 360 ml        Review of Systems:   As per subjective, all others negative.    Physical Exam:     General Appearance:  Alert, cooperative, no distress. Patient is pleasant and expressing worry about delays with discharge due to pain and function   Head:  Normocephalic, without obvious abnormality, atraumatic   Eyes:   Conjunctiva/corneas clear, EOM's intact   ENT/Throat: Lips, mouth are moist.   Lymph/Neck: Symmetrical, trachea midline, no adenopathy, thyroid: not enlarged, symmetric    Lungs:   Clear to auscultation bilaterally, respirations unlabored, even chest rise. Symmetrical movement    Chest Wall:  No tenderness or deformity   Cardiovascular/Heart:  Regular rate and rhythm, S1, S2 normal,no murmur, rub or gallop.  No edema, pulses intact.  Normotensive with flat lying   Abdomen:   Soft, non-tender, bowel sounds active all four quadrants,  no masses, no organomegaly   Musculoskeletal: Extremities normal, atraumatic  Incision not observed   Skin: Skin color good, lesions none   Neurologic: Alert and oriented X 3, Moves all 4 extremities.        Psych: Affect is normal  Grooming is good           Imaging:  Personally Reviewed.       Results for orders placed or performed during the hospital encounter of 07/28/23   CT Abdomen Pelvis w/o Contrast    Impression    IMPRESSION:   1.  Interval L2-L4 spinal fixation. Subcutaneous left paraspinal hyperdense fluid collection is likely postoperative seroma or hematoma measuring up to 5.7 cm. No thick wall or internal gas to suggest abscess.  2.  No noncontrast evidence of acute abdominal or pelvic process to account for symptomatology.  3.  Moderate retained pancolonic stool.     XR Chest Port 1 View    Impression    IMPRESSION: Borderline heart size with normal pulmonary vascularity. Mildly calcified  thoracic aorta. Old left rib fractures. Minimal linear atelectasis left lung base. The chest is otherwise normal. The chest is rotated.          Lab Results:  Personally Reviewed.   Last Comprehensive Metabolic Panel:  Sodium   Date Value Ref Range Status   08/02/2023 137 136 - 145 mmol/L Final   07/04/2009 133 133 - 144 mmol/L Final     Potassium   Date Value Ref Range Status   08/02/2023 4.0 3.4 - 5.3 mmol/L Final   07/04/2009 4.1 3.4 - 5.3 mmol/L Final     Chloride   Date Value Ref Range Status   08/02/2023 100 98 - 107 mmol/L Final   07/04/2009 101 94 - 109 mmol/L Final     Carbon Dioxide   Date Value Ref Range Status   07/04/2009 27 20 - 32 mmol/L Final     Carbon Dioxide (CO2)   Date Value Ref Range Status   08/02/2023 28 22 - 29 mmol/L Final     Anion Gap   Date Value Ref Range Status   08/02/2023 9 7 - 15 mmol/L Final   07/04/2009 4.6 (L) 6 - 17 mmol/L Final     Glucose   Date Value Ref Range Status   08/02/2023 139 (H) 70 - 99 mg/dL Final   07/04/2009 99 60 - 99 mg/dL Final     GLUCOSE BY METER POCT   Date Value Ref Range Status   07/31/2023 109 (H) 70 - 99 mg/dL Final     Urea Nitrogen   Date Value Ref Range Status   08/02/2023 14.6 8.0 - 23.0 mg/dL Final   07/04/2009 19 7 - 30 mg/dL Final     Creatinine   Date Value Ref Range Status   08/02/2023 0.55 0.51 - 0.95 mg/dL Final   07/04/2009 0.80 0.52 - 1.04 mg/dL Final     Comment:     New IDMS-traceable calibration  beginning 5/1/08     GFR Estimate   Date Value Ref Range Status   08/02/2023 >90 >60 mL/min/1.73m2 Final   07/04/2009 73 >60 mL/min/1.7m2 Final     Calcium   Date Value Ref Range Status   08/02/2023 8.9 8.8 - 10.2 mg/dL Final   07/04/2009 8.7 8.5 - 10.4 mg/dL Final        UA: No results found for: UAMP, UBARB, BENZODIAZEUR, UCANN, UCOC, OPIT, UPCP           Please see A&P for additional details of medical decision making.  MANAGEMENT DISCUSSED with the following over the past 24 hours: Claudia Burks RN, Johnie De Santiago MD   NOTE(S)/MEDICAL  RECORDS REVIEWED over the past 24 hours: Yes  Tests REVIEWED in the past 24 hours:  - See lab/imaging results included in the data section of the note  - BMP  - CBC  Medical complexity over the past 24 hours:  - Decision regarding ESCALATION OF LEVEL OF CARE  - Prescription DRUG MANAGEMENT performed  25 MINUTES SPENT BY ME on the date of service doing chart review, history, exam, documentation & further activities per the note.      Bailey Nunn, APRN CNP, PGMT-BC, ACHPN  [facility]   Coverage Monday-Friday 8:00-4:30   No weekend coverage contact house officer  AMCOM Paging/Directory Pain  Securely message with the [employer] Web Console (learn more here)

## 2023-08-02 NOTE — PLAN OF CARE
Goal Outcome Evaluation:       Patient vital signs are at baseline: Yes Tele is off no chest pain  Patient able to ambulate as they were prior to admission or with assist devices provided by therapies during their stay:  Yes Pt is assist x 1 with walker and gait belt    Patient MUST void prior to discharge:  Yes used toilet/ commode  Patient able to tolerate oral intake: No  did not eat super  due to  pain  Pain has adequate pain control using Oral analgesics:  Yes Oxy, Tylenol, diclofenac gel, menthol. Heat applied  Does patient have an identified :  Yes her son  Has goal D/C date and time been discussed with patient:  Yes  home    CMS: Lower left extremities. numbness, back incision dressing clean,dry intact.  Pt  complains pain rating 10/10, consist  ask for pain medication or either frequently sleeping between care.

## 2023-08-02 NOTE — PROGRESS NOTES
Patients back pain into legs more uncontrolled today.  This is likely more back/surgical related.  No new neuro change.  She had some dizziness when up and was noted to be orthostatic with the back pain earlier.  She also has a history of orthostatic issues.  No chest pain or other new complaints.  She did have another BM since yesterday AM and it appears constipation resolving.   She could not do PT this AM given the pain.  We will let the pain team know and see if they can return.  In addition, I have held her losartan and we will see how orthostatics run into tomorrow.  She is adamant she will not consider TCU/rehab.  I have explained she cannot discharge home with pain significant enough she cannot get to the bathroom even with staff assist.  She will need to stay in the hospital until at least tomorrow.

## 2023-08-02 NOTE — PROGRESS NOTES
DAILY PROGRESS NOTE    Marleen Bobo is a 78 year old old female admitted on 7/28/2023 10:47 AM.    Subjective  Marleen is a 79 y/o female presenting POD 5. Pt states that she is tolerating her pain at this time. She has been continuing to ambulate without concerns and denies abdominal pain at this time. Pt states she has been eating well, sleeping when comfortable and is wanting to go home today.      Objective     AAOx3, CORTES , f/c 4/4   Ambulating,  Sensation intact bilaterally            Hemoglobin   Date Value Ref Range Status   08/01/2023 10.8 (L) 11.7 - 15.7 g/dL Final   07/04/2009 12.0 11.7 - 15.7 g/dL Final   ]       Impression / Plan  Patient is doing very well and is ready for discharge today pending hospital clearance. Patient is cleared for discharge from a surgical point.      Plan for today:     Patient doing well  Ambulate with help  PT OT, possibly clearance and discharge  Full diet  Today  Wean and d/c PCA and transition to PO meds today

## 2023-08-03 ENCOUNTER — APPOINTMENT (OUTPATIENT)
Dept: PHYSICAL THERAPY | Facility: CLINIC | Age: 78
DRG: 454 | End: 2023-08-03
Attending: ORTHOPAEDIC SURGERY
Payer: COMMERCIAL

## 2023-08-03 ENCOUNTER — APPOINTMENT (OUTPATIENT)
Dept: OCCUPATIONAL THERAPY | Facility: CLINIC | Age: 78
DRG: 454 | End: 2023-08-03
Attending: ORTHOPAEDIC SURGERY
Payer: COMMERCIAL

## 2023-08-03 VITALS
WEIGHT: 178.2 LBS | HEART RATE: 65 BPM | BODY MASS INDEX: 28.64 KG/M2 | SYSTOLIC BLOOD PRESSURE: 110 MMHG | OXYGEN SATURATION: 93 % | RESPIRATION RATE: 16 BRPM | TEMPERATURE: 96.7 F | DIASTOLIC BLOOD PRESSURE: 67 MMHG | HEIGHT: 66 IN

## 2023-08-03 LAB
ATRIAL RATE - MUSE: 70 BPM
DIASTOLIC BLOOD PRESSURE - MUSE: NORMAL MMHG
INTERPRETATION ECG - MUSE: NORMAL
P AXIS - MUSE: 78 DEGREES
PR INTERVAL - MUSE: 116 MS
QRS DURATION - MUSE: 138 MS
QT - MUSE: 410 MS
QTC - MUSE: 442 MS
R AXIS - MUSE: -52 DEGREES
SYSTOLIC BLOOD PRESSURE - MUSE: NORMAL MMHG
T AXIS - MUSE: 31 DEGREES
VENTRICULAR RATE- MUSE: 70 BPM

## 2023-08-03 PROCEDURE — 97535 SELF CARE MNGMENT TRAINING: CPT | Mod: GO | Performed by: REHABILITATION PRACTITIONER

## 2023-08-03 PROCEDURE — 250N000013 HC RX MED GY IP 250 OP 250 PS 637: Performed by: ORTHOPAEDIC SURGERY

## 2023-08-03 PROCEDURE — 250N000013 HC RX MED GY IP 250 OP 250 PS 637: Performed by: INTERNAL MEDICINE

## 2023-08-03 PROCEDURE — 250N000013 HC RX MED GY IP 250 OP 250 PS 637: Performed by: NURSE PRACTITIONER

## 2023-08-03 PROCEDURE — 97116 GAIT TRAINING THERAPY: CPT | Mod: GP

## 2023-08-03 PROCEDURE — 97530 THERAPEUTIC ACTIVITIES: CPT | Mod: GP

## 2023-08-03 RX ORDER — AMOXICILLIN 250 MG
1 CAPSULE ORAL 2 TIMES DAILY
Qty: 20 TABLET | Refills: 0 | Status: SHIPPED | OUTPATIENT
Start: 2023-08-03 | End: 2023-08-13

## 2023-08-03 RX ORDER — GABAPENTIN 100 MG/1
100 CAPSULE ORAL 3 TIMES DAILY
Qty: 30 CAPSULE | Refills: 0 | Status: SHIPPED | OUTPATIENT
Start: 2023-08-03 | End: 2023-08-13

## 2023-08-03 RX ORDER — OXYCODONE HYDROCHLORIDE 5 MG/1
5 TABLET ORAL EVERY 4 HOURS PRN
Qty: 20 TABLET | Refills: 0 | Status: SHIPPED | OUTPATIENT
Start: 2023-08-03 | End: 2023-08-08

## 2023-08-03 RX ORDER — METHOCARBAMOL 500 MG/1
500 TABLET, FILM COATED ORAL 4 TIMES DAILY PRN
Qty: 30 TABLET | Refills: 1 | Status: SHIPPED | OUTPATIENT
Start: 2023-08-03

## 2023-08-03 RX ADMIN — CARBIDOPA AND LEVODOPA 1 TABLET: 50; 200 TABLET, EXTENDED RELEASE ORAL at 02:38

## 2023-08-03 RX ADMIN — Medication 1 G: at 13:36

## 2023-08-03 RX ADMIN — SENNOSIDES AND DOCUSATE SODIUM 1 TABLET: 50; 8.6 TABLET ORAL at 08:46

## 2023-08-03 RX ADMIN — ASPIRIN 81 MG: 81 TABLET, CHEWABLE ORAL at 08:45

## 2023-08-03 RX ADMIN — OXYCODONE HYDROCHLORIDE 7.5 MG: 5 TABLET ORAL at 13:34

## 2023-08-03 RX ADMIN — Medication 1 G: at 08:46

## 2023-08-03 RX ADMIN — ACETAMINOPHEN 650 MG: 325 TABLET, FILM COATED ORAL at 06:23

## 2023-08-03 RX ADMIN — ACETAMINOPHEN 650 MG: 325 TABLET, FILM COATED ORAL at 13:33

## 2023-08-03 RX ADMIN — OXYCODONE HYDROCHLORIDE 7.5 MG: 5 TABLET ORAL at 06:24

## 2023-08-03 RX ADMIN — CARBIDOPA AND LEVODOPA 3 TABLET: 25; 100 TABLET ORAL at 13:33

## 2023-08-03 RX ADMIN — DICLOFENAC SODIUM TOPICAL GEL, 1% 4 G: 10 GEL TOPICAL at 13:36

## 2023-08-03 RX ADMIN — CLOPIDOGREL BISULFATE 75 MG: 75 TABLET ORAL at 08:46

## 2023-08-03 RX ADMIN — OXYCODONE HYDROCHLORIDE 7.5 MG: 5 TABLET ORAL at 09:36

## 2023-08-03 RX ADMIN — OXYCODONE HYDROCHLORIDE 7.5 MG: 5 TABLET ORAL at 01:50

## 2023-08-03 RX ADMIN — ROSUVASTATIN CALCIUM 20 MG: 20 TABLET, FILM COATED ORAL at 08:46

## 2023-08-03 RX ADMIN — DICLOFENAC SODIUM TOPICAL GEL, 1% 4 G: 10 GEL TOPICAL at 10:27

## 2023-08-03 RX ADMIN — GABAPENTIN 100 MG: 100 CAPSULE ORAL at 13:33

## 2023-08-03 RX ADMIN — CARBIDOPA AND LEVODOPA 3 TABLET: 25; 100 TABLET ORAL at 08:53

## 2023-08-03 RX ADMIN — FLUTICASONE FUROATE AND VILANTEROL TRIFENATATE 1 PUFF: 100; 25 POWDER RESPIRATORY (INHALATION) at 13:35

## 2023-08-03 RX ADMIN — GABAPENTIN 100 MG: 100 CAPSULE ORAL at 09:37

## 2023-08-03 ASSESSMENT — ACTIVITIES OF DAILY LIVING (ADL)
ADLS_ACUITY_SCORE: 27

## 2023-08-03 NOTE — PLAN OF CARE
Physical Therapy Discharge Summary    Reason for therapy discharge:    All goals and outcomes met, no further needs identified.    Progress towards therapy goal(s). See goals on Care Plan in [facility] electronic health record for goal details.  Goals met    Therapy recommendation(s):    Continued therapy is recommended.  Rationale/Recommendations:  Rec assist with mobility for safety and use of FWW. Rec HHPT to progress strength and IND with mobility.

## 2023-08-03 NOTE — PLAN OF CARE
Goal Outcome Evaluation:      Plan of Care Reviewed With: patient    Overall Patient Progress: improving      Pt AOX4, VSS except elevated BP's. Pt O2 in the upper 80's on RA. 2l of 02 via NC applied. Sat's improved to 90's Up with assist of 1 gaitbelt and walker to the bathroom. Voiding w/o difficulty. Pain better controlled with PRN oxycodone and scheduled gapabentin, tylenol and pain creams. Continues to be positive for orthostatic BP. Plan is to discharge to home

## 2023-08-03 NOTE — PROGRESS NOTES
Care Management Follow Up    Length of Stay (days): 6    Expected Discharge Date: 08/03/2023     Concerns to be Addressed: care coordination/care conferences, discharge planning     Patient plan of care discussed at interdisciplinary rounds: Yes    Anticipated Discharge Disposition: Home with HC- patient declines TCU     Anticipated Discharge Services: Other (see comment) (Possible home health care)  Anticipated Discharge DME: None   Education Provided on the Discharge Plan: Yes  Patient/Family in Agreement with the Plan: yes     Additional Information:  Patient continues to decline TCU. OT requested we add a HHA. CM called [facility] and they do not have HHA for her area. [facility] will continue to provide RN, PT and OT.  CM will continue to monitor for discharge planning.    Jadyn Craig, RN, BSN, CM  Inpatient Care Coordination  [facility]  [phone]

## 2023-08-03 NOTE — DISCHARGE SUMMARY
Discharge Summary    Attending Physician:  Roger Nation  Admit Date: 7/28/2023    Discharge Date: 8/3/23  Primary Care Physician: [facility]  [facility]    Discharge Diagnoses  [unfilled]  S/p L2-L4 OLLIF on 7/28/23    Discharge Exam    AAOx3 CORTES f/c all for , no weakness, No new sensory deficit, incision C/D/I  Patient is ambulating well. She states that the pain is tolerable.    Preliminary Discharge Medications    This list of medications is preliminary and tentative.  Please see the After Visit Summary for the final and accurate medication list.    [unfilled]    Procedures Performed and Findings  Procedure(s):  L2to L4 oblique lateral lumbar interbody fusion, L2 to L4 Posterior minimally invasive pedicle screw placement and posterolateral instrumentation and fusion       Consultations Obtained  OCCUPATIONAL THERAPY ADULT IP CONSULT  PHYSICAL THERAPY ADULT IP CONSULT  HOSPITALIST IP CONSULT  SOCIAL WORK IP CONSULT  CARE MANAGEMENT / SOCIAL WORK IP CONSULT  CARE MANAGEMENT / SOCIAL WORK IP CONSULT  PAIN MANAGEMENT ADULT IP CONSULT    Code Status   Full Code    Diet on Discharge:  Continue Regular    Activity on Discharge:  Your activity upon discharge: Ad janes within following limitations:  No excessive activities   No Bending, Twisting, climbing, Crawling,   No lifting more than 8 lb for 2 weeks, or 15 lb for 2 months or 25 lb for 4 months or 35 lb for 6 months  Brace for riding cars for 4-6 months      Discharge Instructions  Per [facility] instruction : [email or web address]      Follow-Up Scheduled    Follow up in 2 weeks with me or PCP for wound check ( patient's choice) if patients want to go to PCP for wound check, then f/u in my office  In 1 months      Hospital Course  Hospital Course consisted of 6 days in the hospital , pt had adequate ambulation in due time, pain controlled , cleared by PT/OT, no events         CC Era Varela PA-C

## 2023-08-03 NOTE — PLAN OF CARE
A&Ox4. VSS. A1 with gb and walker, brace. Painful, PRN and scheduled meds helpful. Voiding. Passing gas. Numbness to BLE at baseline. Surgical site HAY, CDI. Patient declining TCU, plans to go home with son. AVS done and meds for discharge.

## 2023-08-04 NOTE — PLAN OF CARE
Occupational Therapy Discharge Summary    Reason for therapy discharge:    Discharged to home with home therapy.    Progress towards therapy goal(s). See [facility] on [facility] for goal details.  Goals not met.  Barriers to achieving goals:   discharge from facility.    Therapy recommendation(s):    Continued therapy is recommended.  Rationale/Recommendations:  HHOT recommended to maximize indep. Recommendation of Ax1 for all ADLs/IADLs at this time.

## 2023-08-22 ENCOUNTER — LAB REQUISITION (OUTPATIENT)
Dept: LAB | Facility: CLINIC | Age: 78
End: 2023-08-22
Payer: COMMERCIAL

## 2023-08-22 DIAGNOSIS — I95.9 HYPOTENSION, UNSPECIFIED: ICD-10-CM

## 2024-03-05 NOTE — PROVIDER NOTIFICATION
CT has questions about the Abd CT order and they were unable to reach Dr. Lorenzo Rasmussen to Dr. Branch to see if he can clarify their questions.   Chronic osteomyelitis

## 2024-05-22 ENCOUNTER — MEDICAL CORRESPONDENCE (OUTPATIENT)
Dept: HEALTH INFORMATION MANAGEMENT | Facility: CLINIC | Age: 79
End: 2024-05-22
Payer: COMMERCIAL

## 2024-05-29 ENCOUNTER — TRANSCRIBE ORDERS (OUTPATIENT)
Dept: OTHER | Age: 79
End: 2024-05-29

## 2024-05-29 DIAGNOSIS — R32 URINARY INCONTINENCE, UNSPECIFIED TYPE: Primary | ICD-10-CM

## 2024-06-10 NOTE — TELEPHONE ENCOUNTER
MEDICAL RECORDS REQUEST   [facility]  Urology Clinic  [address] [place] [id number]  PHONE: [phone]  Fax: [phone]        FUTURE VISIT INFORMATION                                                   Marleen Bobo, : 1945 scheduled for future visit at [facility]    APPOINTMENT INFORMATION:  Date: 2024  Provider:  Goldy Santos MD  Reason for Visit/Diagnosis: Urinary incontinence    REFERRAL INFORMATION:  Referring provider:  Telma Dick DNP @ [facility]      RECORDS REQUESTED FOR VISIT                                                     NOTES  STATUS/DETAILS   OFFICE NOTE from referring provider  YES, 2024 -- Telma Dick DNP, FNP-C @ [facility]   MEDICATION LIST  yes   LABS     URINALYSIS (UA)  yes     PRE-VISIT CHECKLIST      Joint diagnostic appointment coordinated correctly          (ensure right order & amount of time) Yes   RECORD COLLECTION COMPLETE Yes

## 2024-06-17 ENCOUNTER — PRE VISIT (OUTPATIENT)
Dept: UROLOGY | Facility: CLINIC | Age: 79
End: 2024-06-17
Payer: COMMERCIAL

## 2024-06-17 NOTE — TELEPHONE ENCOUNTER
Reason for visit: Urinary incontinence      Relevant information: N/A    Records/imaging/labs/orders: Located in EPIC    Pt called: No need for a call    At Rooming: Standard rooming, determine if PVR is necessary with provider    Jerri Ramachandran  6/17/2024  2:37 PM

## 2024-06-21 ENCOUNTER — OFFICE VISIT (OUTPATIENT)
Dept: UROLOGY | Facility: CLINIC | Age: 79
End: 2024-06-21
Payer: COMMERCIAL

## 2024-06-21 ENCOUNTER — PRE VISIT (OUTPATIENT)
Dept: UROLOGY | Facility: CLINIC | Age: 79
End: 2024-06-21

## 2024-06-21 VITALS
WEIGHT: 170 LBS | HEIGHT: 64 IN | BODY MASS INDEX: 29.02 KG/M2 | DIASTOLIC BLOOD PRESSURE: 69 MMHG | SYSTOLIC BLOOD PRESSURE: 111 MMHG | HEART RATE: 60 BPM

## 2024-06-21 DIAGNOSIS — N39.498 OTHER URINARY INCONTINENCE: Primary | ICD-10-CM

## 2024-06-21 PROCEDURE — 99203 OFFICE O/P NEW LOW 30 MIN: CPT | Mod: 25 | Performed by: OBSTETRICS & GYNECOLOGY

## 2024-06-21 PROCEDURE — 51798 US URINE CAPACITY MEASURE: CPT | Performed by: OBSTETRICS & GYNECOLOGY

## 2024-06-21 RX ORDER — LOSARTAN POTASSIUM 25 MG/1
1 TABLET ORAL DAILY
COMMUNITY
Start: 2023-11-01

## 2024-06-21 ASSESSMENT — PAIN SCALES - GENERAL: PAINLEVEL: MILD PAIN (2)

## 2024-06-21 ASSESSMENT — PATIENT HEALTH QUESTIONNAIRE - PHQ9
SUM OF ALL RESPONSES TO PHQ QUESTIONS 1-9: 5
10. IF YOU CHECKED OFF ANY PROBLEMS, HOW DIFFICULT HAVE THESE PROBLEMS MADE IT FOR YOU TO DO YOUR WORK, TAKE CARE OF THINGS AT HOME, OR GET ALONG WITH OTHER PEOPLE: NOT DIFFICULT AT ALL
SUM OF ALL RESPONSES TO PHQ QUESTIONS 1-9: 5

## 2024-06-21 NOTE — PROGRESS NOTES
June 21, 2024    Referring Provider: Telma Dick NP  [facility]  [address] [place] [address]    Primary Care Provider: [facility], [facility]    CC: urinary incontinence     HPI: Marleen Bobo is a 79 year old female with a history of SUNITHA, hysterectomy with unilateral oophorectomy for uterine cancer, cervical cancer, parkinsonism, and L2-4 spinal fusion 07/2023 who presents for evaluation of urinary incontinence. Referred by patient's PCP NP Telma Dick. Patient reports onset years ago (prior to back surgery) of urge incontinence; however, this has exacerbated every since her back surgery last year. She goes through 10 pads/day and sometimes feels urge but also often has leakage without urge. Denies any incontinence with cough/sneeze/exercise. Never tried PFPT. Never tried meds.  Denies any recurrent UTI, hematuria    Also notes constant, sharp right inguinal pain that radiates to RLQ, which started after back surgery. Walking makes it worse. Takes Percocet and Gabapentin, which helps.    Prolapse:  Do you feel a vaginal bulge? no                                      Pressure? no   Do you have to place your fingers in the vagina or in the rectum to have a bowel movement? -  Impact to quality of life? -     Stress Incontinence:  Do you leak urine with cough, sneeze, exercise? no  How often do you leak with cough, sneeze, exercise?  -  How much do you usually leak? -   Do you wear a pad? yes If so; 10 pads/day  Impact to quality of life? -    Urge Incontinence:  Do you often get sudden urges to urinate? Hard to tell, occasional  How often do have urges? -  If so, do you leak with these urges? Yes  How much do you usually leak? A lot; goes through 10 pads/day  Impact to quality of life? Significant    Voids/day: every 20 min  Nocturia: 3-4x/night. 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 sometimes  Weak or intermittent stream? Sometimes, dribbling  Incomplete emptying or dribbling? Yes dribbling  Pain or burning with urination? no  Any blood in your urine? no    GI:  Constipation? Yes, senna not helping. Apples helps  Frequency stools 1x every few days   Straining for stools yes  Stool consistency hard     Ever leak stool (Accidental Bowel Leakage)? No   History of irritable bowel or Crohn's? No    Sexual/Pain:  Are you currently having sex? No, no partner     Prior therapy:  Ever done pelvic floor physical therapy? no  Trial of medication? no  Have you ever tried a pessary? no    Medical History:  Do you have?   High Cholesterol? no     Diabetes? no  High Blood pressure? yes     Recurrent UTIs? no  Sleep Apnea? 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 Yes and unilateral oophorectomy 2/2 cervical cancer   Bladder Surgery? no   Other? Appendectomy, lumbar spine x4 fusions, cataract surgery, craniotomy d/t osteomyelitis, hysterectomy and unilateral oophorectomy for uterine cancer     OB/Gyn History:  Pregnancies? 4  Deliveries? 4  Vaginal 4  Section 0   Last Pap smear? unknown Any abnormal? -  Last mammogram? unknown  Last colonoscopy? unknown    Medications/Vitamins/Supplements:     Current Outpatient Medications:     budesonide-formoterol (SYMBICORT) 80-4.5 MCG/ACT Inhaler, Inhale 2 puffs into the lungs daily as needed, Disp: , Rfl:      carbidopa-levodopa (SINEMET CR)  MG CR tablet, Take 1 tablet by mouth At Bedtime And 1 tablet during the night 8551-3711 upon waking with tremors, Disp: , Rfl:     carbidopa-levodopa (SINEMET)  MG per tablet, Take 3 tablets by mouth 3 times daily At 0900, 1300 and 1700, Disp: , Rfl:     clopidogrel (PLAVIX) 75 MG tablet, Take 1 tablet by mouth every morning, Disp: , Rfl:     etanercept (ENBREL) 50 MG/ML injection, Inject 50 mg Subcutaneous once a week, Disp: , Rfl:     losartan (COZAAR) 25 MG tablet, Take 1 tablet by mouth daily, Disp: , Rfl:     methocarbamol (ROBAXIN) 500 MG tablet, Take 1 tablet (500 mg) by mouth 4 times daily as needed for muscle spasms, Disp: 30 tablet, Rfl: 1    naloxone (NARCAN) 4 MG/0.1ML nasal spray, Spray 1 spray (4 mg) into one nostril alternating nostrils as needed for opioid reversal every 2-3 minutes until assistance arrives, Disp: 0.2 mL, Rfl: 0    nitroGLYcerin (NITROSTAT) 0.4 MG sublingual tablet, Place 0.4 mg under the tongue as needed, Disp: , Rfl:     rosuvastatin (CRESTOR) 20 MG tablet, Take 20 mg by mouth daily, Disp: , Rfl:     amoxicillin (AMOXIL) 875 MG tablet, TAKE ONE TABLET BY MOUTH TWICE DAILY UNTIL GONE .FINISH COURSE (Patient not taking: Reported on 6/21/2024), Disp: , Rfl:     aspirin (ASA) 81 MG chewable tablet, Take 81 mg by mouth daily (Patient not taking: Reported on 6/21/2024), Disp: , Rfl:     gabapentin (NEURONTIN) 100 MG capsule, Take 1 capsule (100 mg) by mouth 3 times daily for 10 days, Disp: 30 capsule, Rfl: 0    loratadine (CLARITIN) 10 MG tablet, Take 10 mg by mouth daily as needed (Patient not taking: Reported on 6/21/2024), Disp: , Rfl:      Drug Allergies: levofloxacin, tegaderm, clonidine, methotrexate, prednisone, sulfasalazine, adhesive, dilantin, liquid adhesive, silver nitrate    Latex Allergy: mild  Iodine Allergy no    Family History: (list relationship and age at diagnosis)  Breast cancer? no   Ovarian cancer? no   Colon cancer?  no    Social History:  Marital status: single  Do you/ have you ever smoke(d)  cigarettes? yes  Drink more than 1 alcoholic beverage a day?  Not asked  Occupation? 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    Arthritis     Cervical cancer (H)     COPD (chronic obstructive pulmonary disease) (H)     Double vision     Gastroesophageal reflux disease     Glaucoma     Headache(784.0)     Heart attack (H)     Hypertension     Other chronic pain     Parkinsons disease (H)     Sleep apnea     no cpap    Sneezing     Stented coronary artery     Stroke (H)     , ,     Subarachnoid hemorrhage (H)        Past Surgical History:   Procedure Laterality Date    aneurysm clipping      brain  and  (Metal-No MRI)    APPENDECTOMY  1970    BACK SURGERY      lumbar spine surgeries x4 Fusions L4 L5 S1    CATARACT IOL, RT/LT Right 2015    with iStent    CATARACT IOL, RT/LT Left     CRANIOTOMY      multiple due to osteomyelitis    FUSION SPINE POSTERIOR MINIMALLY INVASIVE TWO LEVELS N/A 2023    Procedure: L2to L4 oblique lateral lumbar interbody fusion, L2 to L4 Posterior minimally invasive pedicle screw placement and posterolateral instrumentation and fusion;  Surgeon: Roger Nation MD;  Location: [facility] OR    HYSTERECTOMY         Social History     Socioeconomic History    Marital status:      Spouse name: Not on file    Number of children: Not on file    Years of education: Not on file    Highest education level: Not on file   Occupational History    Not on file   Tobacco Use    Smoking status: Former     Current packs/day: 0.00     Types: Cigarettes     Quit date: 10/25/2011     Years since quittin.6    Smokeless tobacco: Never   Substance and Sexual Activity    Alcohol use: Yes     Comment: 1-2 drinks per month    Drug use: Never    Sexual activity: Not on file   Other Topics Concern    Parent/sibling w/ CABG, MI or  angioplasty before 65F 55M? Not Asked   Social History Narrative    Not on file     Social Determinants of Health     Financial Resource Strain: Not on File (2024)    Received from [facility]     Financial Resource Strain     Financial Resource Strain: 0   Food Insecurity: Not on File (2024)    Received from [facility]     Food Insecurity     Food: 0   Transportation Needs: Not on File (2024)    Received from [facility]     Transportation Needs     Transportation: 0   Physical Activity: Not on File (2024)    Received from [facility]     Physical Activity     Physical Activity: 0   Stress: Not on File (2024)    Received from [facility]     Stress     Stress: 0   Social Connections: Not on File (2024)    Received from [facility]     Social Connections     Social Connections and Isolation: 0   Interpersonal Safety: Not on file   Housing Stability: Not on File (2024)    Received from [facility]     Housing Stability     Housin       Family History   Problem Relation Age of Onset    Diabetes Maternal Grandmother     Cerebrovascular Disease Father     Cardiovascular Father     Arthritis Mother     Glaucoma Mother        ROS    Allergies   Allergen Reactions    Levofloxacin Anaphylaxis and Other (See Comments)    Tegaderm Transparent Dressing (Informational Only) Rash    Clonidine Unknown and Other (See Comments)     See Care Everywhere      Methotrexate Other (See Comments)    Prednisone Unknown and Other (See Comments)    Sulfasalazine Nausea and Vomiting    Adhesive Tape Rash    Dilantin [Phenytoin] Anxiety    Latex Itching and Rash    Liquid Adhesive Rash     tegaderm    Silver Nitrate Rash       Current Outpatient Medications   Medication Sig Dispense Refill    budesonide-formoterol (SYMBICORT) 80-4.5 MCG/ACT Inhaler Inhale 2 puffs into the lungs daily as needed      carbidopa-levodopa (SINEMET CR)  MG CR tablet Take 1 tablet by mouth At Bedtime And 1 tablet during the night [id number] upon waking with  "tremors      carbidopa-levodopa (SINEMET)  MG per tablet Take 3 tablets by mouth 3 times daily At 0900, 1300 and 1700      clopidogrel (PLAVIX) 75 MG tablet Take 1 tablet by mouth every morning      etanercept (ENBREL) 50 MG/ML injection Inject 50 mg Subcutaneous once a week      losartan (COZAAR) 25 MG tablet Take 1 tablet by mouth daily      methocarbamol (ROBAXIN) 500 MG tablet Take 1 tablet (500 mg) by mouth 4 times daily as needed for muscle spasms 30 tablet 1    naloxone (NARCAN) 4 MG/0.1ML nasal spray Spray 1 spray (4 mg) into one nostril alternating nostrils as needed for opioid reversal every 2-3 minutes until assistance arrives 0.2 mL 0    nitroGLYcerin (NITROSTAT) 0.4 MG sublingual tablet Place 0.4 mg under the tongue as needed      rosuvastatin (CRESTOR) 20 MG tablet Take 20 mg by mouth daily      amoxicillin (AMOXIL) 875 MG tablet TAKE ONE TABLET BY MOUTH TWICE DAILY UNTIL GONE .FINISH COURSE (Patient not taking: Reported on 6/21/2024)      aspirin (ASA) 81 MG chewable tablet Take 81 mg by mouth daily (Patient not taking: Reported on 6/21/2024)      gabapentin (NEURONTIN) 100 MG capsule Take 1 capsule (100 mg) by mouth 3 times daily for 10 days 30 capsule 0    loratadine (CLARITIN) 10 MG tablet Take 10 mg by mouth daily as needed (Patient not taking: Reported on 6/21/2024)       No current facility-administered medications for this visit.       /69   Pulse 60   Ht 1.626 m (5' 4\")   Wt 77.1 kg (170 lb)   BMI 29.18 kg/m   No LMP recorded. Patient has had a hysterectomy. Body mass index is 29.18 kg/m .  Pt is alert, comfortable in no acute distress, non-labored breathing.   Abdomen is soft, non-tender, non-distended, no CVAT.    Normal external female genitalia. The urethra was not hypermobile.    She has adequate support on supine strain.  Speculum and bimanual exam are unremarkable.  Rectal exam with normal tone, no masses or tenderness.     PVR 18 mL bladder scan    A/P: Marleen Bobo " is a 79 year old female with a history of SUNITHA since 2012, hysterectomy with unilateral oophorectomy for uterine cancer, parkinsonism, VINNIE, and L2-4 spinal fusion 07/2023 who presents for evaluation of urinary incontinence.     Patient has a hx of SUNITHA in her chart but no leakage on exam today. Unclear whether patient is having urge followed by incontinence and/or continuous leakage. Recommend UDS testing for clarity.    - Schedule UDS and follow-up accordingly    Daniela Bowers  MS4  3:24 PM      I attest that I was present for the history and personally performed the physical exam and medical decision making and that I agree with the findings and treatment plan outlined above.     I spent a total of 40 minutes with  Ms. Bobo  on the date of the encounter in chart review, face to face patient visit, review of tests, documentation and/or discussion with other providers about the issues documented above.     Goldy Santos MD  Professor, OB/GYN  Urogynecologist    CC  Patient Care Team:  Clinic, [facility] as PCP - General  Dario Johnston MD as MD (Ophthalmology)  Telma Briceno, NP as Referring Physician  Goldy Santos MD as MD (OB/Gyn)  TELMA BRICENO             Answers submitted by the patient for this visit:  Patient Health Questionnaire (Submitted on 6/21/2024)  If you checked off any problems, how difficult have these problems made it for you to do your work, take care of things at home, or get along with other people?: Not difficult at all  PHQ9 TOTAL SCORE: 5

## 2024-06-21 NOTE — LETTER
6/21/2024       RE: Marleen Bobo  [address]  [facility] [id number]     Dear Colleague,    Thank you for referring your patient, Marleen Bobo, to the [facility] [place] at [facility]. Please see a copy of my visit note below.    June 21, 2024    Referring Provider: Telma Dick NP  [facility]  [address] [place] [address]    Primary Care Provider: Clinic, [facility]    CC: urinary incontinence     HPI: Marleen Bobo is a 79 year old female with a history of SUNITHA, hysterectomy with unilateral oophorectomy for uterine cancer, cervical cancer, parkinsonism, and L2-4 spinal fusion 07/2023 who presents for evaluation of urinary incontinence. Referred by patient's PCP NP Telma Dick. Patient reports onset years ago (prior to back surgery) of urge incontinence; however, this has exacerbated every since her back surgery last year. She goes through 10 pads/day and sometimes feels urge but also often has leakage without urge. Denies any incontinence with cough/sneeze/exercise. Never tried PFPT. Never tried meds.  Denies any recurrent UTI, hematuria    Also notes constant, sharp right inguinal pain that radiates to RLQ, which started after back surgery. Walking makes it worse. Takes Percocet and Gabapentin, which helps.    Prolapse:  Do you feel a vaginal bulge? no                                      Pressure? no   Do you have to place your fingers in the vagina or in the rectum to have a bowel movement? -  Impact to quality of life? -     Stress Incontinence:  Do you leak urine with cough, sneeze, exercise? no  How often do you leak with cough, sneeze, exercise?  -  How much do you usually leak? -   Do you wear a pad? yes If so; 10 pads/day  Impact to quality of life? -    Urge Incontinence:  Do you often get sudden urges to urinate? Hard to tell, occasional  How often do have  urges? -  If so, do you leak with these urges? Yes  How much do you usually leak? A lot; goes through 10 pads/day  Impact to quality of life? Significant    Voids/day: every 20 min  Nocturia: 3-4x/night. 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 sometimes  Weak or intermittent stream? Sometimes, dribbling  Incomplete emptying or dribbling? Yes dribbling  Pain or burning with urination? no  Any blood in your urine? no    GI:  Constipation? Yes, senna not helping. Apples helps  Frequency stools 1x every few days   Straining for stools yes  Stool consistency hard     Ever leak stool (Accidental Bowel Leakage)? No   History of irritable bowel or Crohn's? No    Sexual/Pain:  Are you currently having sex? No, no partner     Prior therapy:  Ever done pelvic floor physical therapy? no  Trial of medication? no  Have you ever tried a pessary? no    Medical History:  Do you have?   High Cholesterol? no     Diabetes? no  High Blood pressure? yes     Recurrent UTIs? no  Sleep Apnea? 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 Yes and unilateral oophorectomy 2/2 cervical cancer   Bladder Surgery? no   Other? Appendectomy, lumbar spine x4 fusions, cataract surgery, craniotomy d/t osteomyelitis, hysterectomy and unilateral oophorectomy for uterine cancer     OB/Gyn History:  Pregnancies? 4  Deliveries? 4  Vaginal 4   Section 0   Last Pap smear? unknown Any abnormal? -  Last mammogram? unknown  Last colonoscopy? unknown    Medications/Vitamins/Supplements:     Current Outpatient Medications:     budesonide-formoterol (SYMBICORT) 80-4.5 MCG/ACT Inhaler, Inhale 2 puffs into the lungs daily as needed, Disp: , Rfl:     carbidopa-levodopa (SINEMET CR)  MG CR tablet, Take 1 tablet by mouth At Bedtime And 1 tablet during the night 9662-1016 upon waking with tremors, Disp: , Rfl:     carbidopa-levodopa (SINEMET)  MG per tablet, Take 3 tablets by mouth 3 times daily At 0900, 1300 and 1700, Disp: , Rfl:     clopidogrel (PLAVIX) 75 MG tablet, Take 1 tablet by mouth every morning, Disp: , Rfl:     etanercept (ENBREL) 50 MG/ML injection, Inject 50 mg Subcutaneous once a week, Disp: , Rfl:     losartan (COZAAR) 25 MG tablet, Take 1 tablet by mouth daily, Disp: , Rfl:     methocarbamol (ROBAXIN) 500 MG tablet, Take 1 tablet (500 mg) by mouth 4 times daily as needed for muscle spasms, Disp: 30 tablet, Rfl: 1    naloxone (NARCAN) 4 MG/0.1ML nasal spray, Spray 1 spray (4 mg) into one nostril alternating nostrils as needed for opioid reversal every 2-3 minutes until assistance arrives, Disp: 0.2 mL, Rfl: 0    nitroGLYcerin (NITROSTAT) 0.4 MG sublingual tablet, Place 0.4 mg under the tongue as needed, Disp: , Rfl:     rosuvastatin (CRESTOR) 20 MG tablet, Take 20 mg by mouth daily, Disp: , Rfl:     amoxicillin (AMOXIL) 875 MG tablet, TAKE ONE TABLET BY MOUTH TWICE DAILY UNTIL GONE .FINISH COURSE (Patient not taking: Reported on 6/21/2024), Disp: , Rfl:     aspirin (ASA) 81 MG chewable tablet, Take 81 mg by mouth daily (Patient not taking: Reported on 6/21/2024), Disp: , Rfl:     gabapentin (NEURONTIN) 100 MG capsule, Take 1 capsule (100 mg) by mouth 3 times daily for 10 days, Disp: 30 capsule, Rfl: 0    loratadine (CLARITIN) 10 MG tablet, Take 10 mg by mouth daily as needed (Patient not taking: Reported on 6/21/2024), Disp: , Rfl:       Drug Allergies: levofloxacin, tegaderm, clonidine, methotrexate, prednisone, sulfasalazine, adhesive, dilantin, liquid adhesive, silver nitrate    Latex Allergy: mild  Iodine Allergy no    Family History: (list relationship and age at diagnosis)  Breast cancer? no   Ovarian cancer? no   Colon cancer? no    Social History:  Marital status: single  Do you/ have you ever smoke(d)  cigarettes? yes  Drink more than 1 alcoholic beverage a day?  Not asked  Occupation? -    Past Medical History:   Diagnosis Date    Aneurysm of other specified arteries (H24) 1999    cerebral    Arthritis     Cervical cancer (H) 1970    COPD (chronic obstructive pulmonary disease) (H)     Double vision     Gastroesophageal reflux disease     Glaucoma     Headache(784.0)     Heart attack (H)     Hypertension     Other chronic pain     Parkinsons disease (H)     Sleep apnea     no cpap    Sneezing     Stented coronary artery     Stroke (H)     1994, 2003, 2006    Subarachnoid hemorrhage (H) 1994       Past Surgical History:   Procedure Laterality Date    aneurysm clipping      brain 1994 and 1999 (Metal-No MRI)    APPENDECTOMY  1970    BACK SURGERY      lumbar spine surgeries x4 Fusions L4 L5 S1    CATARACT IOL, RT/LT Right 01/14/2015    with iStent    CATARACT IOL, RT/LT Left 2015    CRANIOTOMY      multiple due to osteomyelitis    FUSION SPINE POSTERIOR MINIMALLY INVASIVE TWO LEVELS N/A 7/28/2023    Procedure: L2to L4 oblique lateral lumbar interbody fusion, L2 to L4 Posterior minimally invasive pedicle screw placement and posterolateral instrumentation and fusion;  Surgeon: Roger Nation MD;  Location: [facility] OR    HYSTERECTOMY         Social History     Socioeconomic History    Marital status:      Spouse name: Not on file    Number of children: Not on file    Years of education: Not on file    Highest education level: Not on file   Occupational History    Not on file   Tobacco Use    Smoking status: Former     Current packs/day:  0.00     Types: Cigarettes     Quit date: 10/25/2011     Years since quittin.6    Smokeless tobacco: Never   Substance and Sexual Activity    Alcohol use: Yes     Comment: 1-2 drinks per month    Drug use: Never    Sexual activity: Not on file   Other Topics Concern    Parent/sibling w/ CABG, MI or angioplasty before 65F 55M? Not Asked   Social History Narrative    Not on file     Social Determinants of Health     Financial Resource Strain: Not on File (2024)    Received from [facility]     Financial Resource Strain     Financial Resource Strain: 0   Food Insecurity: Not on File (2024)    Received from [facility]     Food Insecurity     Food: 0   Transportation Needs: Not on File (2024)    Received from [facility]     Transportation Needs     Transportation: 0   Physical Activity: Not on File (2024)    Received from [facility]     Physical Activity     Physical Activity: 0   Stress: Not on File (2024)    Received from [facility]     Stress     Stress: 0   Social Connections: Not on File (2024)    Received from [facility]     Social Connections     Social Connections and Isolation: 0   Interpersonal Safety: Not on file   Housing Stability: Not on File (2024)    Received from [facility] [facility] [facility]     Housing Stability     Housin       Family History   Problem Relation Age of Onset    Diabetes Maternal Grandmother     Cerebrovascular Disease Father     Cardiovascular Father     Arthritis Mother     Glaucoma Mother        ROS    Allergies   Allergen Reactions    Levofloxacin Anaphylaxis and Other (See Comments)    Tegaderm Transparent Dressing (Informational Only) Rash    Clonidine Unknown and Other (See Comments)     See Care Everywhere      Methotrexate Other (See Comments)    Prednisone Unknown and Other (See Comments)    Sulfasalazine Nausea and Vomiting    Adhesive Tape Rash    Dilantin [Phenytoin] Anxiety    Latex Itching and Rash    Liquid Adhesive Rash     tegaderm    Silver Nitrate Rash       Current Outpatient  "Medications   Medication Sig Dispense Refill    budesonide-formoterol (SYMBICORT) 80-4.5 MCG/ACT Inhaler Inhale 2 puffs into the lungs daily as needed      carbidopa-levodopa (SINEMET CR)  MG CR tablet Take 1 tablet by mouth At Bedtime And 1 tablet during the night 5409-8145 upon waking with tremors      carbidopa-levodopa (SINEMET)  MG per tablet Take 3 tablets by mouth 3 times daily At 0900, 1300 and 1700      clopidogrel (PLAVIX) 75 MG tablet Take 1 tablet by mouth every morning      etanercept (ENBREL) 50 MG/ML injection Inject 50 mg Subcutaneous once a week      losartan (COZAAR) 25 MG tablet Take 1 tablet by mouth daily      methocarbamol (ROBAXIN) 500 MG tablet Take 1 tablet (500 mg) by mouth 4 times daily as needed for muscle spasms 30 tablet 1    naloxone (NARCAN) 4 MG/0.1ML nasal spray Spray 1 spray (4 mg) into one nostril alternating nostrils as needed for opioid reversal every 2-3 minutes until assistance arrives 0.2 mL 0    nitroGLYcerin (NITROSTAT) 0.4 MG sublingual tablet Place 0.4 mg under the tongue as needed      rosuvastatin (CRESTOR) 20 MG tablet Take 20 mg by mouth daily      amoxicillin (AMOXIL) 875 MG tablet TAKE ONE TABLET BY MOUTH TWICE DAILY UNTIL GONE .FINISH COURSE (Patient not taking: Reported on 6/21/2024)      aspirin (ASA) 81 MG chewable tablet Take 81 mg by mouth daily (Patient not taking: Reported on 6/21/2024)      gabapentin (NEURONTIN) 100 MG capsule Take 1 capsule (100 mg) by mouth 3 times daily for 10 days 30 capsule 0    loratadine (CLARITIN) 10 MG tablet Take 10 mg by mouth daily as needed (Patient not taking: Reported on 6/21/2024)       No current facility-administered medications for this visit.       /69   Pulse 60   Ht 1.626 m (5' 4\")   Wt 77.1 kg (170 lb)   BMI 29.18 kg/m   No LMP recorded. Patient has had a hysterectomy. Body mass index is 29.18 kg/m .  Pt is alert, comfortable in no acute distress, non-labored breathing.   Abdomen is soft, " non-tender, non-distended, no CVAT.    Normal external female genitalia. The urethra was not hypermobile.    She has adequate support on supine strain.  Speculum and bimanual exam are unremarkable.  Rectal exam with normal tone, no masses or tenderness.     PVR 18 mL bladder scan    A/P: Marleen Bobo is a 79 year old female with a history of SUNITHA since 2012, hysterectomy with unilateral oophorectomy for uterine cancer, parkinsonism, VINNIE, and L2-4 spinal fusion 07/2023 who presents for evaluation of urinary incontinence.     Patient has a hx of SUNITHA in her chart but no leakage on exam today. Unclear whether patient is having urge followed by incontinence and/or continuous leakage. Recommend UDS testing for clarity.    - Schedule UDS and follow-up accordingly    Daniela Bowers  MS4  3:24 PM      I attest that I was present for the history and personally performed the physical exam and medical decision making and that I agree with the findings and treatment plan outlined above.     I spent a total of 40 minutes with  Ms. Bobo  on the date of the encounter in chart review, face to face patient visit, review of tests, documentation and/or discussion with other providers about the issues documented above.     Goldy Santos MD  Professor, OB/GYN  Urogynecologist    CC  Patient Care Team:  Clinic, [facility] as PCP - General  Dario Johnston MD as MD (Ophthalmology)  Telma Briceno, NP as Referring Physician  Goldy Santos MD as MD (OB/Gyn)  TELMA BRICENO             Answers submitted by the patient for this visit:  Patient Health Questionnaire (Submitted on 6/21/2024)  If you checked off any problems, how difficult have these problems made it for you to do your work, take care of things at home, or get along with other people?: Not difficult at all  PHQ9 TOTAL SCORE: 5

## 2024-06-21 NOTE — PATIENT INSTRUCTIONS
Please schedule next available UDS.     It was a pleasure meeting with you today.  Thank you for allowing me and my team the privilege of caring for you today.  YOU are the reason we are here, and I truly hope we provided you with the excellent service you deserve.  Please let us know if there is anything else we can do for you so that we can be sure you are leaving completely satisfied with your care experience.

## 2024-06-21 NOTE — NURSING NOTE
"Chief Complaint   Patient presents with    Consult For     Urinary incontinence    Pt states when she goes to the bathroom she is unable to void all the way. Pt expressed she has to \"push\" to get all the urine out but she still goes very very frequently as well as goes through \"a lot of pads\"       Blood pressure 111/69, pulse 60, height 1.626 m (5' 4\"), weight 77.1 kg (170 lb). Body mass index is 29.18 kg/m .    Patient Active Problem List   Diagnosis    VINNIE (obstructive sleep apnea)    Lumbar pain    Lumbar stenosis       Allergies   Allergen Reactions    Levofloxacin Anaphylaxis and Other (See Comments)    Tegaderm Transparent Dressing (Informational Only) Rash    Clonidine Unknown and Other (See Comments)     See Care Everywhere      Methotrexate Other (See Comments)    Prednisone Unknown and Other (See Comments)    Sulfasalazine Nausea and Vomiting    Adhesive Tape Rash    Dilantin [Phenytoin] Anxiety    Latex Itching and Rash    Liquid Adhesive Rash     tegaderm    Silver Nitrate Rash       Current Outpatient Medications   Medication Sig Dispense Refill    budesonide-formoterol (SYMBICORT) 80-4.5 MCG/ACT Inhaler Inhale 2 puffs into the lungs daily as needed      carbidopa-levodopa (SINEMET CR)  MG CR tablet Take 1 tablet by mouth At Bedtime And 1 tablet during the night 4002-1338 upon waking with tremors      carbidopa-levodopa (SINEMET)  MG per tablet Take 3 tablets by mouth 3 times daily At 0900, 1300 and 1700      clopidogrel (PLAVIX) 75 MG tablet Take 1 tablet by mouth every morning      etanercept (ENBREL) 50 MG/ML injection Inject 50 mg Subcutaneous once a week      losartan (COZAAR) 25 MG tablet Take 1 tablet by mouth daily      methocarbamol (ROBAXIN) 500 MG tablet Take 1 tablet (500 mg) by mouth 4 times daily as needed for muscle spasms 30 tablet 1    naloxone (NARCAN) 4 MG/0.1ML nasal spray Spray 1 spray (4 mg) into one nostril alternating nostrils as needed for opioid reversal every 2-3 " minutes until assistance arrives 0.2 mL 0    nitroGLYcerin (NITROSTAT) 0.4 MG sublingual tablet Place 0.4 mg under the tongue as needed      rosuvastatin (CRESTOR) 20 MG tablet Take 20 mg by mouth daily      amoxicillin (AMOXIL) 875 MG tablet TAKE ONE TABLET BY MOUTH TWICE DAILY UNTIL GONE .FINISH COURSE (Patient not taking: Reported on 2024)      aspirin (ASA) 81 MG chewable tablet Take 81 mg by mouth daily (Patient not taking: Reported on 2024)      gabapentin (NEURONTIN) 100 MG capsule Take 1 capsule (100 mg) by mouth 3 times daily for 10 days 30 capsule 0    loratadine (CLARITIN) 10 MG tablet Take 10 mg by mouth daily as needed (Patient not taking: Reported on 2024)         Social History     Tobacco Use    Smoking status: Former     Current packs/day: 0.00     Types: Cigarettes     Quit date: 10/25/2011     Years since quittin.6    Smokeless tobacco: Never   Substance Use Topics    Alcohol use: Yes     Comment: 1-2 drinks per month    Drug use: Never       Ana Maria Holland  2024  3:41 PM

## 2024-07-23 ENCOUNTER — PRE VISIT (OUTPATIENT)
Dept: UROLOGY | Facility: CLINIC | Age: 79
End: 2024-07-23
Payer: COMMERCIAL

## 2024-07-23 NOTE — TELEPHONE ENCOUNTER
Reason for visit: UDS    Study scheduled because: urge incontinence and leaking    Relevant information: UDS testing for clarity, already sure that there's urge incontinence    Records/imaging/labs/orders: all records available    UA/UC ordered: yes - recent hx UTI    Vale Cortés  7/23/2024  1:38 PM

## 2024-08-04 ENCOUNTER — HEALTH MAINTENANCE LETTER (OUTPATIENT)
Age: 79
End: 2024-08-04

## 2024-08-05 ENCOUNTER — TELEPHONE (OUTPATIENT)
Dept: UROLOGY | Facility: CLINIC | Age: 79
End: 2024-08-05
Payer: COMMERCIAL

## 2024-08-05 NOTE — TELEPHONE ENCOUNTER
Writer called pt in order to schedule the requested UA/UC. Pt picked up the phone and writer discussed obrtaining labs with the pt. Pt couldn't talk right then and told writer she'd like to have another call within a few days.

## 2024-08-13 ENCOUNTER — TELEPHONE (OUTPATIENT)
Dept: UROLOGY | Facility: CLINIC | Age: 79
End: 2024-08-13
Payer: COMMERCIAL

## 2024-08-13 NOTE — TELEPHONE ENCOUNTER
Left Voicemail (1st Attempt) for the patient to call back and schedule the following:    Appointment type: Return   Provider: Dr. Santos   Return date: Next available (after urodynamic)   Specialty phone number: [phone]  Additonal Notes: Patient on the UDS schedule next Monday. Referred by Dr. Santos and will need follow up with him to review results.  - per message

## 2024-08-15 ENCOUNTER — TELEPHONE (OUTPATIENT)
Dept: UROLOGY | Facility: CLINIC | Age: 79
End: 2024-08-15
Payer: COMMERCIAL

## 2024-08-15 NOTE — TELEPHONE ENCOUNTER
Left Voicemail (2nd Attempt) for the patient to call back and schedule the following:    Appointment type: Return   Provider: Dr. Santos  Return date: Next available   Specialty phone number: [phone]  Additonal Notes: Schedule a follow up with Dr. Santos for UDS results per message

## 2025-04-16 ENCOUNTER — TELEPHONE (OUTPATIENT)
Dept: ORTHOPEDICS | Facility: CLINIC | Age: 80
End: 2025-04-16
Payer: COMMERCIAL

## 2025-04-16 NOTE — TELEPHONE ENCOUNTER
Left Voicemail (1st Attempt) and Sent Mychart (1st Attempt) for the patient to call back and schedule the following:    Appointment type: NEW LUMBAR SPINE  Provider: Sherron Mcnulty, , or Vanessa  Return date: Next Available  Specialty phone number: [phone]

## 2025-04-17 NOTE — TELEPHONE ENCOUNTER
DIAGNOSIS: Lumbar Pain   APPOINTMENT DATE: 5/6/25 @ 2:00 pm    NOTES STATUS DETAILS   OFFICE NOTE from referring provider Internal 4/16/25 (MyChart Note)   OFFICE NOTE from other specialist Care Everywhere 4/15/25, 3/18/25, 2/18/25 Madeleine Coe @[facility]    3/18/25 Meagan Mullen MD  @[facility]    See Additional Encounters   DISCHARGE SUMMARY from hospital Internal 7/28/23-8/3/23 Roger Nation MD @[facility]     OPERATIVE REPORT Internal 7/28/23 Roger Nation MD @[facility] L2to L4 oblique lateral lumbar interbody fusion, L2 to L4 Posterior minimally invasive pedicle screw placement and posterolateral instrumentation and fusion     7/15/09 [place] @[facility] Extensive decompression at the L3-4 level, which was the transitional segment above L4-5, L5-S1 fusion.  Partial laminectomy at L3-4, lateral recess and decompression, decompression at the L4 nerve roots bilaterally      MEDICATION LIST Internal    CT SCAN PACS [facility]  3/20/25 CT Lumbar Spine    [place]  8/15/23 CT Lumbar Spine     XRAYS (IMAGES & REPORTS) Internal [facility]  1/7/20 XR Lumbar Spine  12/23/19 XR Lumbar Spine  12/23/19 XR Pelvis & Hip       Records Requested   April 17, 2025 9:51 AM   [id number]   Facility  [facility]   Outcome 9:54 am Sent request for imaging to be pushed to PACS. -FITO Saldana on 4/17/2025 at 1:35 PM Imaging resolved into PACS. -FITO     Records Requested   April 17, 2025 9:51 AM   [id number]   Facility  [facility]   Outcome 9:55 am Sent request for imaging to be pushed to PACS. -FITO Saldana on 4/17/2025 at 1:35 PM Imaging resolved into PACS. -FITO

## 2025-05-01 DIAGNOSIS — M54.50 LUMBAR PAIN: Primary | ICD-10-CM

## 2025-05-06 ENCOUNTER — PRE VISIT (OUTPATIENT)
Dept: ORTHOPEDICS | Facility: CLINIC | Age: 80
End: 2025-05-06

## 2025-08-16 ENCOUNTER — HEALTH MAINTENANCE LETTER (OUTPATIENT)
Age: 80
End: 2025-08-16

## (undated) DEVICE — GLOVE BIOGEL PI MICRO INDICATOR UNDERGLOVE SZ 8.0 48980

## (undated) DEVICE — DRAPE C-ARM 60X42" 1013

## (undated) DEVICE — FLEXIBLE CURETTE BLADE

## (undated) DEVICE — GLOVE BIOGEL PI SZ 8.0 40880

## (undated) DEVICE — SPONGE RAY-TEC 4X8" 7318

## (undated) DEVICE — DRSG STERI STRIP 1/2X4" R1547

## (undated) DEVICE — PREP DURAPREP 26ML APL 8630

## (undated) DEVICE — SYR 10ML LL W/O NDL

## (undated) DEVICE — LINEN ORTHO ACL PACK 5447

## (undated) DEVICE — DRSG GAUZE 4X4" TRAY

## (undated) DEVICE — ESU GROUND PAD ADULT W/CORD E7507

## (undated) DEVICE — LINEN DRAPE 54X72" 5467

## (undated) DEVICE — CUSHION INSERT LG PRONE VIEW JACKSON TABLE

## (undated) DEVICE — DRAPE MAYO STAND 23X54 8337

## (undated) DEVICE — JAMSHIDI NEEDLES

## (undated) DEVICE — DRSG ABDOMINAL 07 1/2X8" 7197D

## (undated) DEVICE — DECANTER VIAL 2006S

## (undated) DEVICE — DRAPE IOBAN ISOLATION VERTICAL 6619

## (undated) DEVICE — SU VICRYL 2-0 CT-2 27" UND J269H

## (undated) DEVICE — BAG CLEAR TRASH 1.3M 39X33" P4040C

## (undated) DEVICE — BLADE KNIFE SURG 11 371111

## (undated) DEVICE — PACK SMALL SPINE RIDGES

## (undated) DEVICE — GLOVE BIOGEL PI MICRO INDICATOR UNDERGLOVE SZ 7.0 48970

## (undated) DEVICE — Device

## (undated) DEVICE — GLOVE BIOGEL PI MICRO SZ 6.5 48565

## (undated) DEVICE — DECANTER BAG 2002S

## (undated) DEVICE — NEUROPROBE

## (undated) DEVICE — CATH TRAY FOLEY SURESTEP 16FR W/URINE MTR STATLK LF A303416A

## (undated) DEVICE — IOM FLAT FEE

## (undated) RX ORDER — METHADONE HYDROCHLORIDE 10 MG/ML
INJECTION, SOLUTION INTRAMUSCULAR; INTRAVENOUS; SUBCUTANEOUS
Status: DISPENSED
Start: 2023-07-28

## (undated) RX ORDER — EPHEDRINE SULFATE 50 MG/ML
INJECTION, SOLUTION INTRAMUSCULAR; INTRAVENOUS; SUBCUTANEOUS
Status: DISPENSED
Start: 2023-07-28

## (undated) RX ORDER — HYDROMORPHONE HYDROCHLORIDE 1 MG/ML
INJECTION, SOLUTION INTRAMUSCULAR; INTRAVENOUS; SUBCUTANEOUS
Status: DISPENSED
Start: 2023-07-28

## (undated) RX ORDER — HYDROMORPHONE HCL IN WATER/PF 6 MG/30 ML
PATIENT CONTROLLED ANALGESIA SYRINGE INTRAVENOUS
Status: DISPENSED
Start: 2023-07-28

## (undated) RX ORDER — PROPOFOL 10 MG/ML
INJECTION, EMULSION INTRAVENOUS
Status: DISPENSED
Start: 2023-07-28

## (undated) RX ORDER — CEFAZOLIN SODIUM/WATER 2 G/20 ML
SYRINGE (ML) INTRAVENOUS
Status: DISPENSED
Start: 2023-07-28

## (undated) RX ORDER — BUPIVACAINE HYDROCHLORIDE 2.5 MG/ML
INJECTION, SOLUTION EPIDURAL; INFILTRATION; INTRACAUDAL
Status: DISPENSED
Start: 2023-07-28

## (undated) RX ORDER — KETOROLAC TROMETHAMINE 15 MG/ML
INJECTION, SOLUTION INTRAMUSCULAR; INTRAVENOUS
Status: DISPENSED
Start: 2023-07-28

## (undated) RX ORDER — FENTANYL CITRATE 50 UG/ML
INJECTION, SOLUTION INTRAMUSCULAR; INTRAVENOUS
Status: DISPENSED
Start: 2023-07-28

## (undated) RX ORDER — TRANEXAMIC ACID 10 MG/ML
INJECTION, SOLUTION INTRAVENOUS
Status: DISPENSED
Start: 2023-07-28

## (undated) RX ORDER — DIAZEPAM 10 MG/2ML
INJECTION, SOLUTION INTRAMUSCULAR; INTRAVENOUS
Status: DISPENSED
Start: 2023-07-28

## (undated) RX ORDER — DEXMEDETOMIDINE HYDROCHLORIDE 4 UG/ML
INJECTION, SOLUTION INTRAVENOUS
Status: DISPENSED
Start: 2023-07-28